# Patient Record
Sex: FEMALE | Race: WHITE | NOT HISPANIC OR LATINO | Employment: FULL TIME | URBAN - METROPOLITAN AREA
[De-identification: names, ages, dates, MRNs, and addresses within clinical notes are randomized per-mention and may not be internally consistent; named-entity substitution may affect disease eponyms.]

---

## 2017-04-21 ENCOUNTER — HISTORICAL (OUTPATIENT)
Dept: RADIOLOGY | Facility: HOSPITAL | Age: 37
End: 2017-04-21

## 2019-08-30 ENCOUNTER — HISTORICAL (OUTPATIENT)
Dept: RADIOLOGY | Facility: HOSPITAL | Age: 39
End: 2019-08-30

## 2021-09-01 ENCOUNTER — HISTORICAL (OUTPATIENT)
Dept: RADIOLOGY | Facility: HOSPITAL | Age: 41
End: 2021-09-01

## 2022-04-11 ENCOUNTER — HISTORICAL (OUTPATIENT)
Dept: ADMINISTRATIVE | Facility: HOSPITAL | Age: 42
End: 2022-04-11

## 2022-04-22 ENCOUNTER — HISTORICAL (OUTPATIENT)
Dept: ADMINISTRATIVE | Facility: HOSPITAL | Age: 42
End: 2022-04-22

## 2022-04-24 VITALS
HEIGHT: 70 IN | WEIGHT: 265.19 LBS | OXYGEN SATURATION: 98 % | DIASTOLIC BLOOD PRESSURE: 80 MMHG | BODY MASS INDEX: 37.97 KG/M2 | SYSTOLIC BLOOD PRESSURE: 122 MMHG

## 2022-10-24 ENCOUNTER — HOSPITAL ENCOUNTER (OUTPATIENT)
Dept: RADIOLOGY | Facility: HOSPITAL | Age: 42
Discharge: HOME | End: 2022-10-24
Attending: NURSE PRACTITIONER
Payer: COMMERCIAL

## 2022-10-24 DIAGNOSIS — R07.9 CHEST PAIN, UNSPECIFIED: ICD-10-CM

## 2022-10-24 PROCEDURE — 71046 X-RAY EXAM CHEST 2 VIEWS: CPT | Mod: TC

## 2023-04-19 ENCOUNTER — OFFICE VISIT (OUTPATIENT)
Dept: FAMILY MEDICINE | Facility: CLINIC | Age: 43
End: 2023-04-19
Payer: COMMERCIAL

## 2023-04-19 VITALS
OXYGEN SATURATION: 96 % | BODY MASS INDEX: 41.47 KG/M2 | DIASTOLIC BLOOD PRESSURE: 62 MMHG | HEART RATE: 97 BPM | TEMPERATURE: 99 F | WEIGHT: 280 LBS | HEIGHT: 69 IN | SYSTOLIC BLOOD PRESSURE: 116 MMHG

## 2023-04-19 DIAGNOSIS — E16.1 HYPERINSULINISM: ICD-10-CM

## 2023-04-19 DIAGNOSIS — Z00.01 ENCOUNTER FOR WELL ADULT EXAM WITH ABNORMAL FINDINGS: Primary | ICD-10-CM

## 2023-04-19 DIAGNOSIS — G47.19 EXCESSIVE DAYTIME SLEEPINESS: ICD-10-CM

## 2023-04-19 PROBLEM — E66.9 OBESITY: Status: ACTIVE | Noted: 2023-04-19

## 2023-04-19 PROBLEM — E55.9 VITAMIN D DEFICIENCY: Status: ACTIVE | Noted: 2023-04-19

## 2023-04-19 PROBLEM — E53.8 COBALAMIN DEFICIENCY: Status: ACTIVE | Noted: 2023-04-19

## 2023-04-19 PROCEDURE — 3008F PR BODY MASS INDEX (BMI) DOCUMENTED: ICD-10-PCS | Mod: CPTII,,, | Performed by: FAMILY MEDICINE

## 2023-04-19 PROCEDURE — 1159F PR MEDICATION LIST DOCUMENTED IN MEDICAL RECORD: ICD-10-PCS | Mod: CPTII,,, | Performed by: FAMILY MEDICINE

## 2023-04-19 PROCEDURE — 99396 PR PREVENTIVE VISIT,EST,40-64: ICD-10-PCS | Mod: ,,, | Performed by: FAMILY MEDICINE

## 2023-04-19 PROCEDURE — 3078F DIAST BP <80 MM HG: CPT | Mod: CPTII,,, | Performed by: FAMILY MEDICINE

## 2023-04-19 PROCEDURE — 3078F PR MOST RECENT DIASTOLIC BLOOD PRESSURE < 80 MM HG: ICD-10-PCS | Mod: CPTII,,, | Performed by: FAMILY MEDICINE

## 2023-04-19 PROCEDURE — 3008F BODY MASS INDEX DOCD: CPT | Mod: CPTII,,, | Performed by: FAMILY MEDICINE

## 2023-04-19 PROCEDURE — 1160F PR REVIEW ALL MEDS BY PRESCRIBER/CLIN PHARMACIST DOCUMENTED: ICD-10-PCS | Mod: CPTII,,, | Performed by: FAMILY MEDICINE

## 2023-04-19 PROCEDURE — 3074F SYST BP LT 130 MM HG: CPT | Mod: CPTII,,, | Performed by: FAMILY MEDICINE

## 2023-04-19 PROCEDURE — 3074F PR MOST RECENT SYSTOLIC BLOOD PRESSURE < 130 MM HG: ICD-10-PCS | Mod: CPTII,,, | Performed by: FAMILY MEDICINE

## 2023-04-19 PROCEDURE — 1160F RVW MEDS BY RX/DR IN RCRD: CPT | Mod: CPTII,,, | Performed by: FAMILY MEDICINE

## 2023-04-19 PROCEDURE — 99396 PREV VISIT EST AGE 40-64: CPT | Mod: ,,, | Performed by: FAMILY MEDICINE

## 2023-04-19 PROCEDURE — 1159F MED LIST DOCD IN RCRD: CPT | Mod: CPTII,,, | Performed by: FAMILY MEDICINE

## 2023-04-19 RX ORDER — THYROID 15 MG/1
TABLET ORAL
COMMUNITY

## 2023-04-19 RX ORDER — KETOCONAZOLE 20 MG/ML
1 SHAMPOO, SUSPENSION TOPICAL WEEKLY
COMMUNITY
Start: 2023-02-06

## 2023-04-19 RX ORDER — CHOLECALCIFEROL (VITAMIN D3) 25 MCG
1000 TABLET ORAL DAILY
COMMUNITY

## 2023-04-19 RX ORDER — TIRZEPATIDE 5 MG/.5ML
5 INJECTION, SOLUTION SUBCUTANEOUS
Qty: 4 PEN | Refills: 11 | Status: SHIPPED | OUTPATIENT
Start: 2023-04-19 | End: 2023-05-09 | Stop reason: DRUGHIGH

## 2023-04-19 RX ORDER — CYANOCOBALAMIN 1000 UG/ML
1 INJECTION, SOLUTION INTRAMUSCULAR; SUBCUTANEOUS WEEKLY
COMMUNITY
Start: 2023-03-17

## 2023-04-19 NOTE — PROGRESS NOTES
"SUBJECTIVE:  HPI    Latonya Douglass is a 43 y.o. female here for Annual Exam.  She reports that she has been having excessive daytime sleepiness associated with fatigue and unrestful sleep.  She snores loudly.  She has not had any witnessed episodes of apnea but she never feels rested even with a full night sleep.  Occasionally, she awakens and melena night gasping for breath.  She recently had some fasting lab work performed at an outlying clinic and is interested in some weight loss options because she had an elevated insulin level.  She has a strong family history of diabetes.      [place] allergies, medications, history, and problem list were updated as appropriate.    ROS:  Constitutional:  See HPI  ENT:  No runny nose, no sore throat  Cardiovascular:  No palpitations, angina, syncope   Respiratory:  No shortness of breath, cough, hemoptysis  GI: No abdominal pain, diarrhea, change in stools  : No dysuria, hematuria  Skin:  No rashes or lesions of concern   Neuro:  No headaches, paresthesias, weakness  Endocrine:  No polyuria, polydipsia, polyphasia  Psychiatric:  Irritability but no depression or anxiety    OBJECTIVE:  Vital signs  Visit Vitals  /62 (BP Location: Left arm, Patient Position: Sitting)   Pulse 97   Temp 98.5 °F (36.9 °C) (Temporal)   Ht 5' 9" (1.753 m)   Wt 127 kg (280 lb)   LMP 04/05/2023   SpO2 96%   BMI 41.35 kg/m²          PHYSICAL EXAM:  General: Awake, alert, no acute distress  ENT:  External auditory canals normal bilaterally .  Tympanic membranes normal bilaterally.  Oropharynx clear.    Neck:  No bruits, no masses  Cardiovascular:  Regular rhythm.  Normal rate.  No murmurs.  Respiratory: Clear to auscultation bilaterally, normal effort  Abdomen: Soft, nontender, nondistended, no hepatosplenomegaly   Extremities:  No cyanosis, no clubbing, no edema  Skin:  No rashes or appreciable lesions   Neuro:  Cranial nerves 2-12 are intact with usual testing.  Gait is normal.  Moves all 4 " extremities equally and symmetrically.  Psychiatric:  Normal mood and affect.    February 1, 2023:  Hemoglobin 15.5, hematocrit 45, white blood cell count 5.2, platelet count 284, BUN 14, creatinine 0.8, GFR 78, glucose 84, TSH 1.37, vitamin-D 29, insulin level 22.5, B12 level 314, C-peptide 3.85  ASSESSMENT/PLAN:  1. Encounter for well adult exam with abnormal findings  Assessment & Plan:  Therapeutic lifestyle changes for weight loss      2. Hyperinsulinism  Assessment & Plan:  Start Mounjaro 2.5 mg weekly and increase to 5 mg weekly after the 1st month    Orders:  -     tirzepatide (MOUNJARO) 5 mg/0.5 mL PnIj; Inject 5 mg into the skin every 7 days.  Dispense: 4 pen; Refill: 11    3. Excessive daytime sleepiness  Assessment & Plan:  Home sleep study.      CPAP if indicated    Orders:  -     Home Sleep Study; Future            Follow Up:  Follow up in about 3 months (around 7/19/2023) for Follow-up.  Follow-up hyperinsulinism therapy

## 2023-05-08 ENCOUNTER — TELEPHONE (OUTPATIENT)
Dept: FAMILY MEDICINE | Facility: CLINIC | Age: 43
End: 2023-05-08

## 2023-05-08 ENCOUNTER — TELEPHONE (OUTPATIENT)
Dept: FAMILY MEDICINE | Facility: CLINIC | Age: 43
End: 2023-05-08
Payer: COMMERCIAL

## 2023-05-08 NOTE — TELEPHONE ENCOUNTER
Spoke to pt and notified her that her insurance denied her mounjaro she can check her formulary and let us know what would be covered

## 2023-05-09 ENCOUNTER — TELEPHONE (OUTPATIENT)
Dept: FAMILY MEDICINE | Facility: CLINIC | Age: 43
End: 2023-05-09
Payer: COMMERCIAL

## 2023-05-09 DIAGNOSIS — E16.1 HYPERINSULINISM: Primary | ICD-10-CM

## 2023-05-09 RX ORDER — TIRZEPATIDE 2.5 MG/.5ML
2.5 INJECTION, SOLUTION SUBCUTANEOUS
Qty: 4 PEN | Refills: 5 | Status: SHIPPED | OUTPATIENT
Start: 2023-05-09 | End: 2023-08-01

## 2023-05-17 ENCOUNTER — LAB VISIT (OUTPATIENT)
Dept: LAB | Facility: HOSPITAL | Age: 43
End: 2023-05-17
Attending: NURSE PRACTITIONER
Payer: COMMERCIAL

## 2023-05-17 DIAGNOSIS — K92.1 BLOOD IN STOOL: Primary | ICD-10-CM

## 2023-05-17 DIAGNOSIS — R10.9 STOMACH PAIN: ICD-10-CM

## 2023-05-17 LAB
CLOSTRIDIUM DIFFICILE GDH ANTIGEN (OHS): NEGATIVE
CLOSTRIDIUM DIFFICILE TOXIN A/B (OHS): NEGATIVE

## 2023-05-17 PROCEDURE — 87045 FECES CULTURE AEROBIC BACT: CPT

## 2023-05-17 PROCEDURE — 86318 IA INFECTIOUS AGENT ANTIBODY: CPT

## 2023-05-20 LAB — BACTERIA STL CULT: NORMAL

## 2023-06-07 ENCOUNTER — PATIENT MESSAGE (OUTPATIENT)
Dept: FAMILY MEDICINE | Facility: CLINIC | Age: 43
End: 2023-06-07
Payer: COMMERCIAL

## 2023-07-13 ENCOUNTER — HOSPITAL ENCOUNTER (OUTPATIENT)
Dept: RADIOLOGY | Facility: HOSPITAL | Age: 43
Discharge: HOME | End: 2023-07-13
Attending: INTERNAL MEDICINE
Payer: COMMERCIAL

## 2023-07-13 DIAGNOSIS — R10.9 STOMACH ACHE: ICD-10-CM

## 2023-07-13 PROCEDURE — 74019 RADEX ABDOMEN 2 VIEWS: CPT | Mod: TC

## 2023-07-24 PROBLEM — Z00.01 ENCOUNTER FOR WELL ADULT EXAM WITH ABNORMAL FINDINGS: Status: RESOLVED | Noted: 2023-04-19 | Resolved: 2023-07-24

## 2023-07-31 NOTE — PROGRESS NOTES
Chief Complaint: Annual exam    Chief Complaint   Patient presents with    Annual Exam     LMP 2023, No form of contraception, MMG 21, PAP 2018.   L Breast u/s 22, Benign.   Has order for MMG        HPI:   43 y.o. WF  presents for an annual gyn exam.  Pt is not currently on any form of contraception. C/o very heavy menses with clots, severe cramping.    Denies hx abnormal pap.   Due for MMG.    Had u/s last year, results follow:  FINDINGS:        Around the clock sonographic evaluation of left breast(s) was performed including all four quadrants as well as the axilla and subareolar regions.        Left breast:    Axilla: No worrisome solid or cystic lesions noted.    6:00: A 5 mm, benign-appearing simple cyst with enhanced through transmission and no worrisome perfusion on color flow mapping is noted.    9:00: A 1 cm, benign-appearing, simple cyst with enhanced through transmission and no worrisome perfusion on color flow mapping is noted.        IMPRESSION:     1.  Benign fibrocystic changes are noted within the left breast as described above (BI-RADS Category 2/benign findings).  Screening mammography at appropriate age intervals or as is felt to be clinically necessary should BE adequate for follow-up.       FmHx: +breast cancer in [place], negative for uterine, ovarian, and colon cancers.     Labs / Significant Studies:  LMP: 2023  Frequency: monthly   Cycle Length: 7 days   Flow: heavy  Intermenstrual Bleeding: No  Postcoital Bleeding: No  Dysmenorrhea: Yes, Moderate   Sexually Active: yes   Dyspareunia: No  Contraception: none    H/o STI: No   Last pap: 2018  H/o Abnormal Pap: No   Colposcopy: no  Gardasil: 0  MM2021, dense, benign  H/o abnl MMG: FBD  Colonoscopy: Never      Family History   Problem Relation Age of Onset    Heart failure Father     Diabetes type II Father     Melanoma Mother     Breast cancer Paternal Aunt     Cervical cancer Neg Hx     Uterine  cancer Neg Hx     Ovarian cancer Neg Hx          Past Medical History:   Diagnosis Date    Cobalamin deficiency 2023    Obesity 2023    Vitamin D deficiency 2023     Past Surgical History:   Procedure Laterality Date    ANTERIOR CRUCIATE LIGAMENT REPAIR         Current Outpatient Medications:     cyanocobalamin 1,000 mcg/mL injection, Inject 1 mL into the muscle once a week., Disp: , Rfl:     ferrous sulfate, dried (IRON, DRIED, ORAL), Take by mouth., Disp: , Rfl:     ketoconazole (NIZORAL) 2 % shampoo, Apply 1 application topically once a week., Disp: , Rfl:     lactulose 10 gram/15 ml (CHRONULAC) 10 gram/15 mL (15 mL) solution, SMARTSI Gram(s) By Mouth 3 Times Daily, Disp: , Rfl:     MOUNJARO 5 mg/0.5 mL PnIj, SMARTSI pre-filled pen syringe SUB-Q Once a Week, Disp: , Rfl:     omeprazole (PRILOSEC) 40 MG capsule, TAKE 1 CAPSULE BY MOUTH TWICE A DAY FOR REFLUX, Disp: , Rfl:     thyroid, pork, (NP THYROID) 15 mg Tab, Take by mouth before breakfast., Disp: , Rfl:     vitamin D (VITAMIN D3) 1000 units Tab, Take 1,000 Units by mouth once daily., Disp: , Rfl:     Review of patient's allergies indicates:  No Known Allergies    Social History     Tobacco Use    Smoking status: Never    Smokeless tobacco: Never   Substance Use Topics    Alcohol use: Yes    Drug use: Never       Review of Systems:  General/Constitutional: Chills denies. Fatigue/weakness denies. Fever denies. Night sweats denies. Hot flashes denies    Respiratory: Cough denies. Hemoptysis denies. SOB denies. Sputum production denies. Wheezing denies .   Cardiovascular: Chest pain denies . Dizziness denies. Palpitations denies. Swelling in hands/feet denies    Gastrointestinal: Abdominal pain denies. Blood in stool denies. Constipation denies. Diarrhea denies. Heartburn denies. Nausea denies. Vomiting denies    Genitourinary: Incontinence denies. Blood in urine denies. Frequent urination denies. Painful urination denies. Urinary urgency  "denies. Nocturia denies    Gynecologic: Irregular menses denies. Heavy bleeding admits Painful menses admits. Vaginal discharge denies. Vaginal odor denies. Vaginal itching denies. Vaginal lesion denies. Pelvic pain denies. Decreased libido denies. Vulvar lesion denies. Prolapse of genital organs denies. Painful intercourse denies. Postcoital bleeding denies    Psychiatric: Depression denies. Anxiety denies     Physical Exam:   Vitals:    08/01/23 0756   BP: 120/76   Weight: 117.6 kg (259 lb 4.2 oz)   Height: 5' 8" (1.727 m)       Body mass index is 39.42 kg/m².       Chaperone: present.     General appearance: healthy, well-nourished and well-developed     Psychiatric: Orientation to time, place and person. Normal mood and affect and active, alert     Skin: Appearance: no rashes or lesions.     Neck:   Neck: supple, FROM, trachea midline. and no masses   Thyroid: no enlargement or nodules and non-tender.       Cardiovascular:   Auscultation: RRR and no murmur.   Peripheral Vascular: no varicosities, LLE edema, RLE edema, calf tenderness, and palpable cord and pedal pulses intact.     Lungs:   Respiratory effort: no intercostal retractions or accessory muscle usage.   Auscultation: no wheezing, rales/crackles, or rhonchi and clear to auscultation.     Breast:   Inspection/Palpation: no tenderness, discrete/distinct masses, skin changes, or abnormal secretions. Nipple appearance normal. + FBC changes noted bilateral    Abdomen:   Auscultation/Inspection/Palpation: no hepatomegaly, splenomegaly, masses, tenderness or CVA tenderness and soft, non-distended bowel sounds preset.    Hernia: no palpable hernias.     Female Genitalia:    Vulva: no masses, tenderness or lesions    Bladder/Urethra: no urethral discharge or mass, normal meatus, bladder non-distended.    Vagina: no tenderness, erythema, cystocele, rectocele, abnormal vaginal discharge or vesicle(s) or ulcers    Cervix: no discharge, no cervical lacerations " noted or motion tenderness and grossly normal    Uterus: normal size and shape and midline, non-tender, and no uterine prolapse.    Adnexa/Parametria: no parametrial tenderness or mass, no adnexal tenderness or ovarian mass.     Lymph Nodes:   Palpation: non tender submandibular nodes, axillary nodes, or inguinal nodes.     Rectal Exam:   Rectum: normal perianal skin.       Assessment:     Patient Active Problem List   Diagnosis    Cobalamin deficiency    Obesity    Vitamin D deficiency    Hyperinsulinism    Excessive daytime sleepiness       Health Maintenance Due   Topic Date Due    Hepatitis C Screening  Never done    COVID-19 Vaccine (1) Never done    HIV Screening  Never done    TETANUS VACCINE  Never done    Cervical Cancer Screening  12/27/2021    Mammogram  09/01/2022     Health Maintenance Topics with due status: Not Due       Topic Last Completion Date    Influenza Vaccine 12/08/2021    Hemoglobin A1c (Diabetic Prevention Screening) 02/01/2023         Plan:    Latonya was seen today for annual exam.    Diagnoses and all orders for this visit:    Abnormal gynecological examination  PAP  Counseled regarding contraceptive options, and need for contraception until no menses for 1 year.  Reviewed calcium needs, exercise, and prevention of osteoporosis.  Healthy diet and light weightbearing exercise if tolerated.  Reviewed normal perimenopausal transition.  Mammogram recommended yearly.  Colonoscopy recommended at age 45.  RTC 1 y   Bilateral fibrocystic breast changes  - Discussed recommendations of annual screening after age of 40 with mammogram and MRI for patients with lifetime risk greater than 25%     - Explained that screening is not 100% reliable.  Advised patient if she notices any changes to her breast including a lump, mass, dimpling, discharge, rash, or tenderness she should contact us immediately.     - Recommend monthly BSE     Family history of breast cancer    Menorrhagia with regular cycle  Lysteda  as directed  Dysmenorrhea  NSAIDs prn  Cervical cancer screening    Screening for STD (sexually transmitted disease)    Other orders  The following orders have not been finalized:  -     tranexamic acid (LYSTEDA) 650 mg tablet

## 2023-08-01 ENCOUNTER — OFFICE VISIT (OUTPATIENT)
Dept: OBSTETRICS AND GYNECOLOGY | Facility: CLINIC | Age: 43
End: 2023-08-01
Payer: COMMERCIAL

## 2023-08-01 VITALS
BODY MASS INDEX: 39.29 KG/M2 | WEIGHT: 259.25 LBS | SYSTOLIC BLOOD PRESSURE: 120 MMHG | DIASTOLIC BLOOD PRESSURE: 76 MMHG | HEIGHT: 68 IN

## 2023-08-01 DIAGNOSIS — N94.6 DYSMENORRHEA: ICD-10-CM

## 2023-08-01 DIAGNOSIS — Z01.411 ABNORMAL GYNECOLOGICAL EXAMINATION: Primary | ICD-10-CM

## 2023-08-01 DIAGNOSIS — N92.0 MENORRHAGIA WITH REGULAR CYCLE: ICD-10-CM

## 2023-08-01 DIAGNOSIS — Z11.3 SCREENING FOR STD (SEXUALLY TRANSMITTED DISEASE): ICD-10-CM

## 2023-08-01 DIAGNOSIS — N60.12 BILATERAL FIBROCYSTIC BREAST CHANGES: ICD-10-CM

## 2023-08-01 DIAGNOSIS — Z80.3 FAMILY HISTORY OF BREAST CANCER: ICD-10-CM

## 2023-08-01 DIAGNOSIS — N60.11 BILATERAL FIBROCYSTIC BREAST CHANGES: ICD-10-CM

## 2023-08-01 DIAGNOSIS — Z12.4 CERVICAL CANCER SCREENING: ICD-10-CM

## 2023-08-01 PROCEDURE — 3074F SYST BP LT 130 MM HG: CPT | Mod: CPTII,,, | Performed by: NURSE PRACTITIONER

## 2023-08-01 PROCEDURE — 99396 PR PREVENTIVE VISIT,EST,40-64: ICD-10-PCS | Mod: ,,, | Performed by: NURSE PRACTITIONER

## 2023-08-01 PROCEDURE — 3008F BODY MASS INDEX DOCD: CPT | Mod: CPTII,,, | Performed by: NURSE PRACTITIONER

## 2023-08-01 PROCEDURE — 3078F PR MOST RECENT DIASTOLIC BLOOD PRESSURE < 80 MM HG: ICD-10-PCS | Mod: CPTII,,, | Performed by: NURSE PRACTITIONER

## 2023-08-01 PROCEDURE — 3074F PR MOST RECENT SYSTOLIC BLOOD PRESSURE < 130 MM HG: ICD-10-PCS | Mod: CPTII,,, | Performed by: NURSE PRACTITIONER

## 2023-08-01 PROCEDURE — 1160F RVW MEDS BY RX/DR IN RCRD: CPT | Mod: CPTII,,, | Performed by: NURSE PRACTITIONER

## 2023-08-01 PROCEDURE — 1159F MED LIST DOCD IN RCRD: CPT | Mod: CPTII,,, | Performed by: NURSE PRACTITIONER

## 2023-08-01 PROCEDURE — 3008F PR BODY MASS INDEX (BMI) DOCUMENTED: ICD-10-PCS | Mod: CPTII,,, | Performed by: NURSE PRACTITIONER

## 2023-08-01 PROCEDURE — 99396 PREV VISIT EST AGE 40-64: CPT | Mod: ,,, | Performed by: NURSE PRACTITIONER

## 2023-08-01 PROCEDURE — 1160F PR REVIEW ALL MEDS BY PRESCRIBER/CLIN PHARMACIST DOCUMENTED: ICD-10-PCS | Mod: CPTII,,, | Performed by: NURSE PRACTITIONER

## 2023-08-01 PROCEDURE — 1159F PR MEDICATION LIST DOCUMENTED IN MEDICAL RECORD: ICD-10-PCS | Mod: CPTII,,, | Performed by: NURSE PRACTITIONER

## 2023-08-01 PROCEDURE — 3078F DIAST BP <80 MM HG: CPT | Mod: CPTII,,, | Performed by: NURSE PRACTITIONER

## 2023-08-01 RX ORDER — LACTULOSE 10 G/15ML
SOLUTION ORAL
COMMUNITY
Start: 2023-07-13

## 2023-08-01 RX ORDER — TRANEXAMIC ACID 650 MG/1
1300 TABLET ORAL 3 TIMES DAILY
Qty: 30 TABLET | Refills: 11 | Status: SHIPPED | OUTPATIENT
Start: 2023-08-01 | End: 2023-08-31

## 2023-08-01 RX ORDER — OMEPRAZOLE 40 MG/1
CAPSULE, DELAYED RELEASE ORAL
COMMUNITY
Start: 2023-05-15

## 2023-08-01 RX ORDER — TIRZEPATIDE 5 MG/.5ML
INJECTION, SOLUTION SUBCUTANEOUS
COMMUNITY
Start: 2023-06-02

## 2023-08-04 ENCOUNTER — TELEPHONE (OUTPATIENT)
Dept: OBSTETRICS AND GYNECOLOGY | Facility: CLINIC | Age: 43
End: 2023-08-04
Payer: COMMERCIAL

## 2023-08-04 LAB — PSYCHE PATHOLOGY RESULT: NORMAL

## 2023-08-22 ENCOUNTER — HOSPITAL ENCOUNTER (OUTPATIENT)
Dept: RADIOLOGY | Facility: HOSPITAL | Age: 43
Discharge: HOME | End: 2023-08-22
Attending: INTERNAL MEDICINE
Payer: COMMERCIAL

## 2023-08-22 DIAGNOSIS — Z12.31 ENCOUNTER FOR SCREENING MAMMOGRAM FOR BREAST CANCER: ICD-10-CM

## 2023-08-22 PROCEDURE — 77067 SCR MAMMO BI INCL CAD: CPT | Mod: 26,,, | Performed by: STUDENT IN AN ORGANIZED HEALTH CARE EDUCATION/TRAINING PROGRAM

## 2023-08-22 PROCEDURE — 77063 BREAST TOMOSYNTHESIS BI: CPT | Mod: 26,,, | Performed by: STUDENT IN AN ORGANIZED HEALTH CARE EDUCATION/TRAINING PROGRAM

## 2023-08-22 PROCEDURE — 77067 MAMMO DIGITAL SCREENING BILAT WITH TOMO: ICD-10-PCS | Mod: 26,,, | Performed by: STUDENT IN AN ORGANIZED HEALTH CARE EDUCATION/TRAINING PROGRAM

## 2023-08-22 PROCEDURE — 77067 SCR MAMMO BI INCL CAD: CPT | Mod: TC

## 2023-08-22 PROCEDURE — 77063 MAMMO DIGITAL SCREENING BILAT WITH TOMO: ICD-10-PCS | Mod: 26,,, | Performed by: STUDENT IN AN ORGANIZED HEALTH CARE EDUCATION/TRAINING PROGRAM

## 2024-06-07 ENCOUNTER — LAB VISIT (OUTPATIENT)
Dept: LAB | Facility: HOSPITAL | Age: 44
End: 2024-06-07
Attending: OBSTETRICS & GYNECOLOGY
Payer: COMMERCIAL

## 2024-06-07 ENCOUNTER — OFFICE VISIT (OUTPATIENT)
Dept: OBSTETRICS AND GYNECOLOGY | Facility: CLINIC | Age: 44
End: 2024-06-07
Payer: COMMERCIAL

## 2024-06-07 VITALS
SYSTOLIC BLOOD PRESSURE: 110 MMHG | HEIGHT: 68 IN | BODY MASS INDEX: 38.04 KG/M2 | DIASTOLIC BLOOD PRESSURE: 70 MMHG | WEIGHT: 251 LBS

## 2024-06-07 DIAGNOSIS — N94.6 DYSMENORRHEA: ICD-10-CM

## 2024-06-07 DIAGNOSIS — N92.1 MENORRHAGIA WITH IRREGULAR CYCLE: Primary | ICD-10-CM

## 2024-06-07 DIAGNOSIS — N92.1 MENORRHAGIA WITH IRREGULAR CYCLE: ICD-10-CM

## 2024-06-07 LAB
ERYTHROCYTE [DISTWIDTH] IN BLOOD BY AUTOMATED COUNT: 12.6 % (ref 11–14.5)
HCT VFR BLD AUTO: 40.7 % (ref 36–48)
HGB BLD-MCNC: 13.8 G/DL (ref 11.8–16)
MCH RBC QN AUTO: 30.9 PG (ref 27–34)
MCHC RBC AUTO-ENTMCNC: 33.9 G/DL (ref 31–37)
MCV RBC AUTO: 91.1 FL (ref 79–99)
NRBC BLD AUTO-RTO: 0 %
PLATELET # BLD AUTO: 236 X10(3)/MCL (ref 140–371)
PMV BLD AUTO: 8.9 FL (ref 9.4–12.4)
RBC # BLD AUTO: 4.47 X10(6)/MCL (ref 4–5.1)
TSH SERPL-ACNC: 1.29 UIU/ML (ref 0.36–3.74)
WBC # SPEC AUTO: 5.89 X10(3)/MCL (ref 4–11.5)

## 2024-06-07 PROCEDURE — 3078F DIAST BP <80 MM HG: CPT | Mod: CPTII,,, | Performed by: OBSTETRICS & GYNECOLOGY

## 2024-06-07 PROCEDURE — 3074F SYST BP LT 130 MM HG: CPT | Mod: CPTII,,, | Performed by: OBSTETRICS & GYNECOLOGY

## 2024-06-07 PROCEDURE — 36415 COLL VENOUS BLD VENIPUNCTURE: CPT

## 2024-06-07 PROCEDURE — 84443 ASSAY THYROID STIM HORMONE: CPT

## 2024-06-07 PROCEDURE — 85027 COMPLETE CBC AUTOMATED: CPT

## 2024-06-07 PROCEDURE — 99214 OFFICE O/P EST MOD 30 MIN: CPT | Mod: ,,, | Performed by: OBSTETRICS & GYNECOLOGY

## 2024-06-07 PROCEDURE — 3008F BODY MASS INDEX DOCD: CPT | Mod: CPTII,,, | Performed by: OBSTETRICS & GYNECOLOGY

## 2024-06-07 PROCEDURE — 1159F MED LIST DOCD IN RCRD: CPT | Mod: CPTII,,, | Performed by: OBSTETRICS & GYNECOLOGY

## 2024-06-07 RX ORDER — SUCRALFATE 1 G/1
1 TABLET ORAL 3 TIMES DAILY
COMMUNITY
Start: 2024-04-17

## 2024-06-07 RX ORDER — NORGESTIMATE AND ETHINYL ESTRADIOL 0.25-0.035
1 KIT ORAL 3 TIMES DAILY
Qty: 21 TABLET | Refills: 0 | Status: SHIPPED | OUTPATIENT
Start: 2024-06-07 | End: 2024-06-14

## 2024-06-07 RX ORDER — VORTIOXETINE 10 MG/1
1 TABLET, FILM COATED ORAL
COMMUNITY
Start: 2024-05-06

## 2024-06-07 RX ORDER — NORGESTIMATE AND ETHINYL ESTRADIOL 0.25-0.035
1 KIT ORAL DAILY
Qty: 30 TABLET | Refills: 11 | Status: SHIPPED | OUTPATIENT
Start: 2024-06-07 | End: 2025-06-07

## 2024-06-07 NOTE — PROGRESS NOTES
Chief Complaint     Irregular bleeding    HPI:     Patient is a 44 y.o.  presents today with complaints of irregular bleeding. Reports menses q 2 weeks x2-3 months. States to be heavy in flow, 5 days in duration. Reports did not do trial of Lysteda for menorrhagia due to insurance. PAP 23 WNL negative HPV GC CZ TV.  Denies abnormal discharge, odor, fever. Denies SOB, chest pain, weak, fatigue.     Gyn History:    Menstrual History   Cycle: Yes  Menarche Age: 12 years  Flow Duration: 5  Flow: (!) Heavy  Interval: 14  Intermenstrual Bleeding: Yes  Dysmenorrhea: Yes  Dysmenorrhea Severity : Mild  [place]  Sexually Active: Yes  Sexual Orientation: heterosexual  Postcoital Bleeding: No  Dyspareunia: No  STI History: No  Contraception: No  Menopause  Menopause Age: 1 years  Breast History  Last Breast Imaging Date: Yes  Date: 23  History of Abnormal Breast Imaging : No  History of Breast Biopsy: No  Pap History   Last pap date: 23  Result: Normal  History of Abnormal Pap: No  HPV Vaccine Completed: No    Per previous   HPI:   43 y.o. WF  presents for an annual gyn exam.  Pt is not currently on any form of contraception. C/o very heavy menses with clots, severe cramping.    Denies hx abnormal pap.   Due for MMG.    Had u/s last year, results follow:  FINDINGS:        Around the clock sonographic evaluation of left breast(s) was performed including all four quadrants as well as the axilla and subareolar regions.        Left breast:    Axilla: No worrisome solid or cystic lesions noted.    6:00: A 5 mm, benign-appearing simple cyst with enhanced through transmission and no worrisome perfusion on color flow mapping is noted.    9:00: A 1 cm, benign-appearing, simple cyst with enhanced through transmission and no worrisome perfusion on color flow mapping is noted.        IMPRESSION:     1.  Benign fibrocystic changes are noted within the left breast as described above (BI-RADS Category  2/benign findings).  Screening mammography at appropriate age intervals or as is felt to be clinically necessary should BE adequate for follow-up.         FmHx: +breast cancer in [place], negative for uterine, ovarian, and colon cancers.     Past Medical History:   Diagnosis Date    Cobalamin deficiency 2023    Obesity 2023    Vitamin D deficiency 2023       Past Surgical History:   Procedure Laterality Date    ANTERIOR CRUCIATE LIGAMENT REPAIR         Family History   Problem Relation Name Age of Onset    Heart failure Father      Diabetes type II Father      Melanoma Mother      Breast cancer Paternal Aunt      Cervical cancer Neg Hx      Uterine cancer Neg Hx      Ovarian cancer Neg Hx         OB History          2    Para   2    Term   2            AB        Living   2         SAB        IAB        Ectopic        Multiple        Live Births   2                 Current Outpatient Medications on File Prior to Visit   Medication Sig Dispense Refill    cyanocobalamin 1,000 mcg/mL injection Inject 1 mL into the muscle once a week.      ferrous sulfate, dried (IRON, DRIED, ORAL) Take by mouth.      ketoconazole (NIZORAL) 2 % shampoo Apply 1 application topically once a week.      omeprazole (PRILOSEC) 40 MG capsule TAKE 1 CAPSULE BY MOUTH TWICE A DAY FOR REFLUX      sucralfate (CARAFATE) 1 gram tablet Take 1 g by mouth 3 (three) times daily.      thyroid, pork, (NP THYROID) 15 mg Tab Take by mouth before breakfast.      TRINTELLIX 10 mg Tab Take 1 tablet by mouth.      vitamin D (VITAMIN D3) 1000 units Tab Take 1,000 Units by mouth once daily.      lactulose 10 gram/15 ml (CHRONULAC) 10 gram/15 mL (15 mL) solution SMARTSI Gram(s) By Mouth 3 Times Daily (Patient not taking: Reported on 2024)      MOUNJARO 5 mg/0.5 mL PnIj SMARTSI pre-filled pen syringe SUB-Q Once a Week (Patient not taking: Reported on 2024)       No current facility-administered medications on file prior to  "visit.       Review of Systems:       Review of Systems   Constitutional:  Negative for chills and fever.   Gastrointestinal:  Negative for abdominal pain, constipation and diarrhea.   Genitourinary:  Positive for menstrual problem and vaginal bleeding. Negative for bladder incontinence, decreased libido, dysmenorrhea, dyspareunia, dysuria, flank pain, frequency, genital sores, hematuria, hot flashes, menorrhagia, pelvic pain, urgency, vaginal discharge, vaginal pain, urinary incontinence, postcoital bleeding, postmenopausal bleeding, vaginal dryness and vaginal odor.        Physical Exam:    /70   Ht 5' 8" (1.727 m)   Wt 113.9 kg (251 lb)   LMP 06/04/2024   BMI 38.16 kg/m²     Physical Exam   General Exam:    General Appearance: alert, in no acute distress, normal, well nourished.  Psych:  Orientation: time, place, person.  Mood/Affect: Normal   Abdomen:  - Soft. Non-tender. No rebound tenderness or guardingNo masses. Liver normal. Spleen normal. No hernia palpable.  Pelvis:   - Vulva: Normal   - Perianal skin: Normal   - Urethra: Normal meatus. Q-tip: Not performed   - Vagina: Blood in vault. Normal Vaginal discharge present: . Cystocele: Absent. Rectocele: Absent   - Cervix: Cervix dilated. Cervical motion tenderness Absent.    - Uterus:  retroverted uterus vs uterine fibroid noted. Mobile. Non-tender.   - Adnexal: Normal      Assessment:   1. Menorrhagia with irregular cycle  -     US Pelvis Comp with Transvag NON-OB (xpd; Future; Expected date: 06/14/2024  -     TSH; Future; Expected date: 06/07/2024  -     CBC Without Differential; Future; Expected date: 06/07/2024  -     norgestimate-ethinyl estradioL (ORTHO-CYCLEN) 0.25-35 mg-mcg per tablet; Take 1 tablet by mouth 3 (three) times daily. for 7 days  Dispense: 21 tablet; Refill: 0  -     norgestimate-ethinyl estradioL (ORTHO-CYCLEN) 0.25-35 mg-mcg per tablet; Take 1 tablet by mouth once daily.  Dispense: 30 tablet; Refill: 11    2. Dysmenorrhea  -    "  US Pelvis Comp with Transvag NON-OB (xpd; Future; Expected date: 06/14/2024  -     norgestimate-ethinyl estradioL (ORTHO-CYCLEN) 0.25-35 mg-mcg per tablet; Take 1 tablet by mouth 3 (three) times daily. for 7 days  Dispense: 21 tablet; Refill: 0  -     norgestimate-ethinyl estradioL (ORTHO-CYCLEN) 0.25-35 mg-mcg per tablet; Take 1 tablet by mouth once daily.  Dispense: 30 tablet; Refill: 11    3. BMI 38.0-38.9,adult             Plan:       GC GC TV MYCO UREA collected   TSH   CBC   Pt desires orders- to be done at her office, printed.    Discussed abnormal uterine bleeding and potential causes including but not limited to: infection, fibroids, other anatomy abnormalities, hormonal changes or abnormalities, pre-cancerous lesions, malignancy.  Dicussed conservative treatment options such as oral contraceptives, transdermal contraceptions, intrauterine device, or tranexamic acid.  We also discuss surgical treatment options such as endometrial ablation or hysterectomy.     Recommend endometrial sampling to rule out cancer prior to treatment.   Discussed office endometrial biopsy and hysteroscope with dilation and curettage and associated limitations, risks, and benefits of each procedure.   Patient elects office endometrial biopsy    Discussed importance of healthy diet, exercise, and achieving/maintaining a BMI <30.     In order to stop her current episode of bleeding we will begin Sprintec t.i.d. for 7 days followed by continuous pill pack until endometrial biopsy can be performed  Rx Sprintec TID x7 days then Rx Sprintec PO daily     Strict bleeding, ER precautions     RTC 2 weeks Results, EMB     This note was transcribed by Nadiya Houston. There may be transcription errors as a result, however minimal. Effort has been made to ensure accuracy of transcription, but any obvious errors or omissions should be clarified with the author of the document.

## 2024-06-12 ENCOUNTER — HOSPITAL ENCOUNTER (OUTPATIENT)
Dept: RADIOLOGY | Facility: HOSPITAL | Age: 44
Discharge: HOME | End: 2024-06-12
Attending: OBSTETRICS & GYNECOLOGY
Payer: COMMERCIAL

## 2024-06-12 DIAGNOSIS — N94.6 DYSMENORRHEA: ICD-10-CM

## 2024-06-12 DIAGNOSIS — N92.1 MENORRHAGIA WITH IRREGULAR CYCLE: ICD-10-CM

## 2024-06-12 PROCEDURE — 76856 US EXAM PELVIC COMPLETE: CPT | Mod: TC

## 2024-06-12 PROCEDURE — 76830 TRANSVAGINAL US NON-OB: CPT | Mod: TC

## 2024-06-13 NOTE — PROGRESS NOTES
Chief complaint:  Procedure (EMB. -UPT)      History of Present Illness:  Latonya Douglass is a 44 y.o. female  presents for an endometrial biopsy secondary to AUB.  UPT is negative. Patient agrees to proceed.    24: negative GC CZ TV MYCO UREA     Gyn History:    Menstrual History   Cycle: Yes (LMP:23)  Menarche Age: 12 years  Flow Duration: 7  Flow: (!) Heavy  Interval: 28  Intermenstrual Bleeding: No  Dysmenorrhea: Yes  Dysmenorrhea Severity : Moderate  [place]  Sexually Active: Yes  Sexual Orientation: heterosexual  Postcoital Bleeding: No  Dyspareunia: No  STI History: No  Contraception: No  Menopause  Menopause Age: 1 years  Breast History  Last Breast Imaging Date: Yes  Date: 23 (neg)  History of Abnormal Breast Imaging : (!) Yes (FBD)  Pap History   Last pap date: 23 (neg w/ neg GC/CZ/TV)  Result: Normal  History of Abnormal Pap: No  HPV Vaccine Completed: No    PER PREVIOUS NOTE 24:  Patient is a 44 y.o.  presents today with complaints of irregular bleeding. Reports menses q 2 weeks x2-3 months. States to be heavy in flow, 5 days in duration. Reports did not do trial of Lysteda for menorrhagia due to insurance. PAP 23 WNL negative HPV GC CZ TV.  Denies abnormal discharge, odor, fever. Denies SOB, chest pain, weak, fatigue.      Per previous note:   HPI:   43 y.o. WF  presents for an annual gyn exam.  Pt is not currently on any form of contraception. C/o very heavy menses with clots, severe cramping.    Denies hx abnormal pap.   Due for MMG.    Had u/s last year, results follow:  FINDINGS:        Around the clock sonographic evaluation of left breast(s) was performed including all four quadrants as well as the axilla and subareolar regions.        Left breast:    Axilla: No worrisome solid or cystic lesions noted.    6:00: A 5 mm, benign-appearing simple cyst with enhanced through transmission and no worrisome perfusion on color flow mapping is noted.    9:00:  A 1 cm, benign-appearing, simple cyst with enhanced through transmission and no worrisome perfusion on color flow mapping is noted.        IMPRESSION:     1.  Benign fibrocystic changes are noted within the left breast as described above (BI-RADS Category 2/benign findings).  Screening mammography at appropriate age intervals or as is felt to be clinically necessary should BE adequate for follow-up.         FmHx: +breast cancer in [place], negative for uterine, ovarian, and colon cancers      Review of Systems:  General/Constitutional: Chills denies. Fatigue/weakness denies. Fever denies . Night sweats denies . Hot flashes denies  Gastrointestinal: Abdominal pain denies. Blood in stool denies. Constipation denies. Diarrhea denies. Heartburn denies. Nausea denies. Vomiting denies   Genitourinary: Incontinence denies. Blood in urine denies. Frequent urination denies.  Urgency denies. Painful urination denies. Nocturia denies   Gynecologic: Irregular menses denies.  Heavy bleeding  denies.  Painful menses denies.  Vaginal discharge denies. Vaginal odor denies. Vaginal itching/Irritation denies. Vaginal lesion denies.  Pelvic pain denies. Decreased libido denies. Vulvar lesion denies. Prolapse of genital organs denies. Painful intercourse denies. Postcoital bleeding denies   Psychiatric: Mood lability denies.  Depressed mood denies. Suicidal thoughts denies. Anxiety denies.  Overwhelmed denies.  Appetite normal. Energy level normal     OB History          2    Para   2    Term   2            AB        Living   2         SAB        IAB        Ectopic        Multiple        Live Births   2                 Past Medical History:   Diagnosis Date    Cobalamin deficiency 2023    Obesity 2023    Vitamin D deficiency 2023         Current Outpatient Medications:     cyanocobalamin 1,000 mcg/mL injection, Inject 1 mL into the muscle once a week., Disp: , Rfl:     ferrous sulfate, dried (IRON, DRIED, ORAL),  "Take by mouth., Disp: , Rfl:     ketoconazole (NIZORAL) 2 % shampoo, Apply 1 application topically once a week., Disp: , Rfl:     norgestimate-ethinyl estradioL (ORTHO-CYCLEN) 0.25-35 mg-mcg per tablet, Take 1 tablet by mouth once daily., Disp: 30 tablet, Rfl: 11    omeprazole (PRILOSEC) 40 MG capsule, TAKE 1 CAPSULE BY MOUTH TWICE A DAY FOR REFLUX, Disp: , Rfl:     sucralfate (CARAFATE) 1 gram tablet, Take 1 g by mouth 3 (three) times daily., Disp: , Rfl:     thyroid, pork, (NP THYROID) 15 mg Tab, Take by mouth before breakfast., Disp: , Rfl:     TRINTELLIX 10 mg Tab, Take 1 tablet by mouth., Disp: , Rfl:     vitamin D (VITAMIN D3) 1000 units Tab, Take 1,000 Units by mouth once daily., Disp: , Rfl:     lactulose 10 gram/15 ml (CHRONULAC) 10 gram/15 mL (15 mL) solution, SMARTSI Gram(s) By Mouth 3 Times Daily (Patient not taking: Reported on 2024), Disp: , Rfl:     MOUNJARO 5 mg/0.5 mL PnIj, SMARTSI pre-filled pen syringe SUB-Q Once a Week (Patient not taking: Reported on 2024), Disp: , Rfl:     Review of patient's allergies indicates:  No Known Allergies    Past Surgical History:   Procedure Laterality Date    ANTERIOR CRUCIATE LIGAMENT REPAIR         Family History   Problem Relation Name Age of Onset    Heart failure Father      Diabetes type II Father      Melanoma Mother      Breast cancer Paternal Aunt      Cervical cancer Neg Hx      Uterine cancer Neg Hx      Ovarian cancer Neg Hx         Social History     Socioeconomic History    Marital status:    Tobacco Use    Smoking status: Never    Smokeless tobacco: Never   Substance and Sexual Activity    Alcohol use: Yes    Drug use: Never    Sexual activity: Yes     Partners: Male     Birth control/protection: None         Physical Exam:  BP (P) 122/78 (BP Location: Right arm, Patient Position: Sitting, BP Method: Large (Manual))   Pulse (P) 68   Resp (P) 20   Ht (P) 5' 8" (1.727 m)   Wt (P) 114.9 kg (253 lb 6.4 oz)   LMP 2024   " BMI (P) 38.53 kg/m²       Chaperone present.     Constitutional: General appearance: healthy, well-nourished and well-developed   Psychiatric: Orientation to time, place and person. Normal mood and affect and active, alert   Abdomen: Auscultation/Inspection/Palpation: No tenderness or masses. Soft, nondistended    Female Genitalia:      Vulva: no masses, atrophy or lesions      Bladder/Urethra: no urethral discharge or mass, normal meatus, bladder non-distended.      Vagina: no tenderness, erythema, cystocele, rectocele, abnormal vaginal discharge, or vesicle(s) or ulcers                   Cervix: no discharge or cervical motion tenderness and grossly normal      Uterus: normal size and shape and midline, non-tender, and no uterine prolapse.      Adnexa/Parametria: no parametrial tenderness or mass, no adnexal tenderness or ovarian mass.       PROCEDURE:   Verbal consent was obtained.      UPT negative.       Speculum was placed in the vagina. Cervix was cleaned with 3 iodine. The anterior lip of the cervix was then grasped with a single tooth tenaculum. Endometrial Pipette was then advanced into the uterus. Suction was then applied to the endometrial Pipette curettage and was performed to 360 degrees. Pipette was then removed from the uterus and the specimen was then placed in specimen cup adequate tissue was confirmed. Tenaculum was then removed from the patient's cervix.  Excellent hemostasis was achieved. All instruments removed from the patient's vagina.   The specimen was placed in formalyn and sent to Pathology for histology evaluation.The patient tolerated the procedure well.      Assessment/Plan:  1. Menorrhagia with irregular cycle  -     POCT urine pregnancy    2. Dysmenorrhea           POST ENDOMETRIAL BIOPSY COUNSELING:  Manage post biopsy cramping with NSAIDs or Tylenol.  Expect spotting or light bleeding for a few days.  Report bleeding heavier than a period, fever > 101.0 F, worsening pain or a foul  smelling vaginal discharge  Pelvic rest    All questions were answered.    RTC 2 weeks EMB results     This note was transcribed by Nadiya Houston. There may be transcription errors as a result, however minimal. Effort has been made to ensure accuracy of transcription, but any obvious errors or omissions should be clarified with the author of the document.

## 2024-06-19 ENCOUNTER — PROCEDURE VISIT (OUTPATIENT)
Dept: OBSTETRICS AND GYNECOLOGY | Facility: CLINIC | Age: 44
End: 2024-06-19
Payer: COMMERCIAL

## 2024-06-19 DIAGNOSIS — N92.1 MENORRHAGIA WITH IRREGULAR CYCLE: Primary | ICD-10-CM

## 2024-06-19 DIAGNOSIS — N94.6 DYSMENORRHEA: ICD-10-CM

## 2024-06-19 LAB
B-HCG UR QL: NEGATIVE
CTP QC/QA: YES

## 2024-06-19 PROCEDURE — 81025 URINE PREGNANCY TEST: CPT | Mod: ,,, | Performed by: OBSTETRICS & GYNECOLOGY

## 2024-06-19 PROCEDURE — 99499 UNLISTED E&M SERVICE: CPT | Mod: ,,, | Performed by: OBSTETRICS & GYNECOLOGY

## 2024-06-19 PROCEDURE — 58100 BIOPSY OF UTERUS LINING: CPT | Mod: ,,, | Performed by: OBSTETRICS & GYNECOLOGY

## 2024-07-02 NOTE — PROGRESS NOTES
Chief Complaint     Follow-up (F/u EMB results)    HPI:     Patient is a 44 y.o.  with AUB, dysmenorrhea, and BMI 39 resents to follow up EMB results and discuss management options.     EMB:  ENDOMETRIUM, BIOPSY:   PROLIFERATIVE ENDOMETRIUM, NO EVIDENCE OF HYPERPLASIA OR MALIGNANCY IDENTIFIED.         PER PREVIOUS NOTE 24:  Patient is a 44 y.o.  presents today with complaints of irregular bleeding. Reports menses q 2 weeks x2-3 months. States to be heavy in flow, 5 days in duration. Reports did not do trial of Lysteda for menorrhagia due to insurance. PAP 23 WNL negative HPV GC CZ TV.  Denies abnormal discharge, odor, fever. Denies SOB, chest pain, weak, fatigue.      Per previous note:   HPI:   43 y.o. WF  presents for an annual gyn exam.  Pt is not currently on any form of contraception. C/o very heavy menses with clots, severe cramping.    Denies hx abnormal pap.   Due for MMG.    Had u/s last year, results follow:  FINDINGS:    Around the clock sonographic evaluation of left breast(s) was performed including all four quadrants as well as the axilla and subareolar regions.    Left breast:    Axilla: No worrisome solid or cystic lesions noted.    6:00: A 5 mm, benign-appearing simple cyst with enhanced through transmission and no worrisome perfusion on color flow mapping is noted.    9:00: A 1 cm, benign-appearing, simple cyst with enhanced through transmission and no worrisome perfusion on color flow mapping is noted.    IMPRESSION:     1.  Benign fibrocystic changes are noted within the left breast as described above (BI-RADS Category 2/benign findings).  Screening mammography at appropriate age intervals or as is felt to be clinically necessary should BE adequate for follow-up.      FmHx: +breast cancer in [place], negative for uterine, ovarian, and colon cancers          Past Medical History:   Diagnosis Date    Cobalamin deficiency 2023    Obesity 2023    Vitamin D  deficiency 2023       Past Surgical History:   Procedure Laterality Date    ANTERIOR CRUCIATE LIGAMENT REPAIR         Family History   Problem Relation Name Age of Onset    Heart failure Father      Diabetes type II Father      Melanoma Mother      Breast cancer Paternal Aunt      Cervical cancer Neg Hx      Uterine cancer Neg Hx      Ovarian cancer Neg Hx         OB History          2    Para   2    Term   2            AB        Living   2         SAB        IAB        Ectopic        Multiple        Live Births   2                 Current Outpatient Medications on File Prior to Visit   Medication Sig Dispense Refill    cyanocobalamin 1,000 mcg/mL injection Inject 1 mL into the muscle once a week.      ferrous sulfate, dried (IRON, DRIED, ORAL) Take by mouth.      ketoconazole (NIZORAL) 2 % shampoo Apply 1 application topically once a week.      omeprazole (PRILOSEC) 40 MG capsule TAKE 1 CAPSULE BY MOUTH TWICE A DAY FOR REFLUX      sucralfate (CARAFATE) 1 gram tablet Take 1 g by mouth 3 (three) times daily.      thyroid, pork, (NP THYROID) 15 mg Tab Take by mouth before breakfast.      TRINTELLIX 10 mg Tab Take 1 tablet by mouth.      vitamin D (VITAMIN D3) 1000 units Tab Take 1,000 Units by mouth once daily.      lactulose 10 gram/15 ml (CHRONULAC) 10 gram/15 mL (15 mL) solution SMARTSI Gram(s) By Mouth 3 Times Daily (Patient not taking: Reported on 2024)      MOUNJARO 5 mg/0.5 mL PnIj SMARTSI pre-filled pen syringe SUB-Q Once a Week (Patient not taking: Reported on 2024)      norgestimate-ethinyl estradioL (ORTHO-CYCLEN) 0.25-35 mg-mcg per tablet Take 1 tablet by mouth once daily. (Patient not taking: Reported on 7/3/2024) 30 tablet 11     No current facility-administered medications on file prior to visit.       Review of Systems:       Review of Systems   Constitutional:  Negative for chills and fever.   Gastrointestinal:  Negative for abdominal pain, constipation and diarrhea.    Genitourinary:  Positive for vaginal bleeding. Negative for bladder incontinence, decreased libido, dysmenorrhea, dyspareunia, dysuria, flank pain, frequency, genital sores, hematuria, hot flashes, menorrhagia, menstrual problem, pelvic pain, urgency, vaginal discharge, vaginal pain, urinary incontinence, postcoital bleeding, postmenopausal bleeding, vaginal dryness and vaginal odor.        Physical Exam:    /72 (BP Location: Right arm, Patient Position: Sitting, BP Method: Large (Manual))   Wt 116.7 kg (257 lb 3.2 oz)   LMP 06/04/2024   BMI (P) 39.11 kg/m²     Physical Exam   General Exam:    General Appearance: alert, in no acute distress, normal, well nourished.  Psych:  Orientation: time, place, person.  Mood/Affect: Normal       Assessment:   1. Menorrhagia with irregular cycle    2. Dysmenorrhea    3. BMI 39.0-39.9,adult             Plan:   Reviewed pathology  Discussed treatment options with pt for menorrhagia, dysmenorrhea.   Dicussed conservative treatment options such as oral contraceptives, transdermal contraceptions, intrauterine device, or tranexamic acid.   Ibuprofen 600mg q 6 hours or 800mg q 8 hours at onset of pain.   We also discuss surgical treatment options such as endometrial ablation or hysterectomy. Discussed each treatment option in detail including potential side effects and risks of each. Pt desires to consider options and will call office with decision.     RTC PRN      This note was transcribed by Nadiya Houston. There may be transcription errors as a result, however minimal. Effort has been made to ensure accuracy of transcription, but any obvious errors or omissions should be clarified with the author of the document.

## 2024-07-03 ENCOUNTER — OFFICE VISIT (OUTPATIENT)
Dept: OBSTETRICS AND GYNECOLOGY | Facility: CLINIC | Age: 44
End: 2024-07-03
Payer: COMMERCIAL

## 2024-07-03 VITALS
DIASTOLIC BLOOD PRESSURE: 72 MMHG | WEIGHT: 257.19 LBS | SYSTOLIC BLOOD PRESSURE: 118 MMHG | BODY MASS INDEX: 39.11 KG/M2

## 2024-07-03 DIAGNOSIS — N92.1 MENORRHAGIA WITH IRREGULAR CYCLE: Primary | ICD-10-CM

## 2024-07-03 DIAGNOSIS — N94.6 DYSMENORRHEA: ICD-10-CM

## 2024-07-03 PROCEDURE — 3074F SYST BP LT 130 MM HG: CPT | Mod: CPTII,,, | Performed by: OBSTETRICS & GYNECOLOGY

## 2024-07-03 PROCEDURE — 3008F BODY MASS INDEX DOCD: CPT | Mod: CPTII,,, | Performed by: OBSTETRICS & GYNECOLOGY

## 2024-07-03 PROCEDURE — 99213 OFFICE O/P EST LOW 20 MIN: CPT | Mod: ,,, | Performed by: OBSTETRICS & GYNECOLOGY

## 2024-07-03 PROCEDURE — 1159F MED LIST DOCD IN RCRD: CPT | Mod: CPTII,,, | Performed by: OBSTETRICS & GYNECOLOGY

## 2024-07-03 PROCEDURE — 3078F DIAST BP <80 MM HG: CPT | Mod: CPTII,,, | Performed by: OBSTETRICS & GYNECOLOGY

## 2024-07-17 ENCOUNTER — TELEPHONE (OUTPATIENT)
Dept: OBSTETRICS AND GYNECOLOGY | Facility: CLINIC | Age: 44
End: 2024-07-17
Payer: COMMERCIAL

## 2024-07-17 NOTE — TELEPHONE ENCOUNTER
----- Message from Rhonda Fredrick sent at 7/17/2024 11:05 AM CDT -----  Regarding: Patient Message  Contact: Patient  Patient called to inform she would like to schedule her surgery 12/11/2024 or 12/12/24.   Informed patient she will be contacted by office with further instructions.   [phone]

## 2024-07-17 NOTE — TELEPHONE ENCOUNTER
"Per ADALGISA "Put her down tentatively for December 11th. Pre op will need to be around November 11th." Patient notified and call transferred to [facility] to schedule appointment.   "

## 2024-11-25 ENCOUNTER — TELEPHONE (OUTPATIENT)
Dept: OBSTETRICS AND GYNECOLOGY | Facility: CLINIC | Age: 44
End: 2024-11-25
Payer: COMMERCIAL

## 2024-11-25 NOTE — TELEPHONE ENCOUNTER
----- Message from Rhonda sent at 11/25/2024  9:30 AM CST -----  Regarding: Patient Message  [phone]  Patient called wanting to schedule her pre admit apt. That she previously cancelled 11/20/24.  When would Dr Schneider like her to come in? She previously was going to be scheduled for surgery 12/11/24.  Patient wants surgery before December 31 b/c of her deductible.

## 2024-11-25 NOTE — TELEPHONE ENCOUNTER
Patient notified that her surgery will not be able to be scheduled for before December d/t missing her pre op appointment. Patient got rude and stated we are not treating her fairly. Will speak with Dr. Schneider tomorrow to notify him of conversation.

## 2024-11-26 NOTE — TELEPHONE ENCOUNTER
Placed call to pt, informed of Dr Rosario's response. Pt expressed frustration regarding uncertainty of being able to perform surgery before the end of the year. Explained to pt I am trying my best to work her in for discussion for surgery. Pt continued to express frustration, I explained to pt I will not continue the conversation if she continues to be disrespectful. I spoke with front staff and gave pt several dates for discussion. Pt was not happy with the dates. Per Rhonda, offered pt 12/9/24 at 3:40. Pt desires discussion on that date. Again, I explained to pt this does not guarantee surgery will be before the end of the year. Pt voiced understanding.

## 2024-11-26 NOTE — TELEPHONE ENCOUNTER
"Per Dr. Schneider "She can schedule an appt to discuss new surgery date. If appt falls within appropriate date range for pre-admit will do so at time of appt. Earliest availability for surgery is early January."  "

## 2024-11-27 ENCOUNTER — DOCUMENTATION ONLY (OUTPATIENT)
Dept: OBSTETRICS AND GYNECOLOGY | Facility: CLINIC | Age: 44
End: 2024-11-27
Payer: COMMERCIAL

## 2024-11-27 NOTE — PROGRESS NOTES
Spoke with Rhonda in front office. Per Appt notes:  Pt had appt for 11/13/24 for surgery discussion. On 7/30/24 office contacted pt to r/s due to provider being out of office that date. Pt was r/s to 11/20/24 for surgery discussion.   On 11/12/24 pt called office to cancel appt for discussion on 11/20/24 and instructed front staff she did not wish to r/s appt.

## 2024-12-05 ENCOUNTER — TELEPHONE (OUTPATIENT)
Dept: OBSTETRICS AND GYNECOLOGY | Facility: CLINIC | Age: 44
End: 2024-12-05
Payer: COMMERCIAL

## 2024-12-05 DIAGNOSIS — N94.6 DYSMENORRHEA: ICD-10-CM

## 2024-12-05 DIAGNOSIS — N92.1 MENORRHAGIA WITH IRREGULAR CYCLE: Primary | ICD-10-CM

## 2024-12-05 NOTE — TELEPHONE ENCOUNTER
"Per Dr. Schneider "Opening for surgery case on 12/31/24. Please let her know this is the only availability. Will need pre-admit next week in clinic."  "

## 2024-12-09 ENCOUNTER — LAB VISIT (OUTPATIENT)
Dept: LAB | Facility: HOSPITAL | Age: 44
End: 2024-12-09
Attending: OBSTETRICS & GYNECOLOGY
Payer: COMMERCIAL

## 2024-12-09 DIAGNOSIS — Z01.812 PRE-OPERATIVE LABORATORY EXAMINATION: Primary | ICD-10-CM

## 2024-12-09 LAB
ABORH RETYPE: NORMAL
ANION GAP SERPL CALC-SCNC: 5 MEQ/L
B-HCG UR QL: NEGATIVE
BUN SERPL-MCNC: 12.4 MG/DL (ref 7–18.7)
CALCIUM SERPL-MCNC: 8.4 MG/DL (ref 8.4–10.2)
CHLORIDE SERPL-SCNC: 106 MMOL/L (ref 98–107)
CO2 SERPL-SCNC: 28 MMOL/L (ref 22–29)
CREAT SERPL-MCNC: 0.79 MG/DL (ref 0.55–1.02)
CREAT/UREA NIT SERPL: 16
ERYTHROCYTE [DISTWIDTH] IN BLOOD BY AUTOMATED COUNT: 12.8 % (ref 11.5–17)
GFR SERPLBLD CREATININE-BSD FMLA CKD-EPI: >60 ML/MIN/1.73/M2
GLUCOSE SERPL-MCNC: 142 MG/DL (ref 74–100)
GROUP & RH: NORMAL
HCT VFR BLD AUTO: 40.7 % (ref 37–47)
HGB BLD-MCNC: 13.7 G/DL (ref 12–16)
INDIRECT COOMBS: NORMAL
MCH RBC QN AUTO: 31.2 PG (ref 27–31)
MCHC RBC AUTO-ENTMCNC: 33.7 G/DL (ref 33–36)
MCV RBC AUTO: 92.7 FL (ref 80–94)
NRBC BLD AUTO-RTO: 0 %
PLATELET # BLD AUTO: 245 X10(3)/MCL (ref 130–400)
PMV BLD AUTO: 9.4 FL (ref 7.4–10.4)
POTASSIUM SERPL-SCNC: 4.3 MMOL/L (ref 3.5–5.1)
RBC # BLD AUTO: 4.39 X10(6)/MCL (ref 4.2–5.4)
SODIUM SERPL-SCNC: 139 MMOL/L (ref 136–145)
SPECIMEN OUTDATE: NORMAL
WBC # BLD AUTO: 6.8 X10(3)/MCL (ref 4.5–11.5)

## 2024-12-09 PROCEDURE — 86850 RBC ANTIBODY SCREEN: CPT | Performed by: OBSTETRICS & GYNECOLOGY

## 2024-12-09 PROCEDURE — 80048 BASIC METABOLIC PNL TOTAL CA: CPT

## 2024-12-09 PROCEDURE — 81025 URINE PREGNANCY TEST: CPT

## 2024-12-09 PROCEDURE — 85027 COMPLETE CBC AUTOMATED: CPT

## 2024-12-09 PROCEDURE — 36415 COLL VENOUS BLD VENIPUNCTURE: CPT

## 2024-12-10 ENCOUNTER — ANESTHESIA EVENT (OUTPATIENT)
Dept: SURGERY | Facility: HOSPITAL | Age: 44
End: 2024-12-10
Payer: COMMERCIAL

## 2024-12-10 NOTE — H&P
Patient ID: Latonya Douglass is a 44 y.o. female.    Chief Complaint: No chief complaint on file.      HPI:  HPI heavy menstrual cycles    Review of Systems nl 12 point review    No current facility-administered medications for this encounter.     Current Outpatient Medications   Medication Sig Dispense Refill    cyanocobalamin 1,000 mcg/mL injection Inject 1 mL into the muscle once a week.      omeprazole (PRILOSEC) 40 MG capsule Take 40 mg by mouth every morning.      TRINTELLIX 10 mg Tab Take 1 tablet by mouth once daily.      ferrous sulfate, dried (IRON, DRIED, ORAL) Take by mouth.      ketoconazole (NIZORAL) 2 % shampoo Apply 1 application topically once a week.      lactulose 10 gram/15 ml (CHRONULAC) 10 gram/15 mL (15 mL) solution SMARTSI Gram(s) By Mouth 3 Times Daily (Patient not taking: Reported on 2024)      MOUNJARO 5 mg/0.5 mL PnIj       norgestimate-ethinyl estradioL (ORTHO-CYCLEN) 0.25-35 mg-mcg per tablet Take 1 tablet by mouth once daily. (Patient not taking: Reported on 7/3/2024) 30 tablet 11    sucralfate (CARAFATE) 1 gram tablet Take 1 g by mouth 3 (three) times daily.      thyroid, pork, (NP THYROID) 15 mg Tab Take by mouth before breakfast.      vitamin D (VITAMIN D3) 1000 units Tab Take 1,000 Units by mouth once daily.         Review of patient's allergies indicates:  No Known Allergies    Past Medical History:   Diagnosis Date    Cobalamin deficiency 2023    Digestive disorder     Obesity 2023    Vitamin D deficiency 2023       Past Surgical History:   Procedure Laterality Date    ANTERIOR CRUCIATE LIGAMENT REPAIR Right        Social History     Socioeconomic History    Marital status:    Tobacco Use    Smoking status: Never    Smokeless tobacco: Never   Substance and Sexual Activity    Alcohol use: Yes     Comment: social    Drug use: Never    Sexual activity: Yes     Partners: Male     Birth control/protection: None       There were no vitals filed for this  visit.    Physical Exam  S1s2 no murmers  Abd-soft nt nd  Lungs-cta b  Ext-no c/c/e  Neuro-intact  Ext genit-nl  Cervix-parous no lesions  Uterus-nl mobile  Adnexa-no masses appreciated    Assessment & Plan:  Turner ricketts

## 2024-12-11 NOTE — PRE-PROCEDURE INSTRUCTIONS
"[facility]: Outpatient Surgery  Preprocedure Check-In Instructions     Your arrival time for your surgery or procedure is 5:00 am.  We ask patients to arrive about 2 hours before surgery to allow for enough time to review your health history & medications, start your IV, complete any outstanding labwork or tests, and meet your Anesthesiologist.    Expectations: "Because of inconsistent procedure completion times, an unexpected wait may occur. The Physicians would like you to be here to prepare in the event they run ahead of time. We will make you as comfortable as possible and keep you informed. We apologize in advance if this happens."    You will arrive at [facility], [facility] [place], [place].  Enter through the [place] entrance next to the Emergency Room, and come to the 6th floor to the Outpatient Surgery Department.     Visitory Policy:  You are allowed 2 adult visitors to be with you in the hospital. All hospital visitors should be in good current health.  No small children.     What to Bring:  Please have your ID, insurance cards, and all home medication bottles with you at check in.  Bring your CPAP machine if one is used at home.     Fasting:  Nothing to eat after midnight the night before your procedure. This includes no gum and/or tobacco products. You may have clear liquids limited to only: WATER, GATORADE, POWERADE and children can also have PEDIALYTE AND/OR APPLE JUICE , up until 2 hours before your arrival time.   Follow your doctor's instructions for taking any medications on the morning of your procedure.  If no instructions for taking medications were given, do not take any medications but bring your medications in their bottles to your procedure check in.     Follow your doctor's preoperative instructions regarding skin prep, bowel prep, bathing, or showering prior to your procedure.  If any special soaps were provided to you, please use according to " your doctor's instructions. If no instructions were given from your doctor, take a good bath or shower with antibacterial soap the night before and the morning of your procedure.  On the morning of procedure, wear loose, comfortable clothing.  No lotions, makeup, perfumes, colognes, deodorant, or jewelry to your procedure.  Removable items (glasses, contact lenses, dentures, retainers, hearing aids) need to be removed for your procedure.  Bring your storage containers for these items if you wear them.     Artificial nails, body jewelry, eyelash extensions, and/or hair extensions with metal clips are not allowed during your surgery.  If you currently wear any of these items, please arrange for them to be removed prior to your arrival to the hospital.     Outpatient or Same Day Surgeries:  Any patients receiving sedation/anesthesia are advised not to drive for 24 hours after their procedure.  We do not allow patients to drive themselves home after discharge.  If you are going home after your procedure, please have someone available to drive you home from the hospital.        You may call the Outpatient Surgery Department at [phone] with any questions or concerns.  We are looking forward to meeting you and taking great care of you for your procedure.  Thank you for choosing [facility] for your surgical needs.

## 2024-12-12 ENCOUNTER — HOSPITAL ENCOUNTER (OUTPATIENT)
Facility: HOSPITAL | Age: 44
Discharge: HOME | End: 2024-12-12
Attending: OBSTETRICS & GYNECOLOGY | Admitting: OBSTETRICS & GYNECOLOGY
Payer: COMMERCIAL

## 2024-12-12 ENCOUNTER — ANESTHESIA (OUTPATIENT)
Dept: SURGERY | Facility: HOSPITAL | Age: 44
End: 2024-12-12
Payer: COMMERCIAL

## 2024-12-12 DIAGNOSIS — N94.6 DYSMENORRHEA: ICD-10-CM

## 2024-12-12 DIAGNOSIS — E16.1 HYPERINSULINISM: ICD-10-CM

## 2024-12-12 DIAGNOSIS — N93.9 ABNORMAL UTERINE BLEEDING: ICD-10-CM

## 2024-12-12 DIAGNOSIS — Z01.811 PRE-OP CHEST EXAM: ICD-10-CM

## 2024-12-12 DIAGNOSIS — N92.1 MENORRHAGIA WITH IRREGULAR CYCLE: Primary | ICD-10-CM

## 2024-12-12 DIAGNOSIS — E53.8 COBALAMIN DEFICIENCY: ICD-10-CM

## 2024-12-12 DIAGNOSIS — N92.5 OTHER SPECIFIED IRREGULAR MENSTRUATION: ICD-10-CM

## 2024-12-12 DIAGNOSIS — G47.19 EXCESSIVE DAYTIME SLEEPINESS: ICD-10-CM

## 2024-12-12 DIAGNOSIS — E55.9 VITAMIN D DEFICIENCY: ICD-10-CM

## 2024-12-12 LAB
B-HCG UR QL: NEGATIVE
CTP QC/QA: YES

## 2024-12-12 PROCEDURE — 37000009 HC ANESTHESIA EA ADD 15 MINS: Performed by: OBSTETRICS & GYNECOLOGY

## 2024-12-12 PROCEDURE — 71000016 HC POSTOP RECOV ADDL HR: Performed by: OBSTETRICS & GYNECOLOGY

## 2024-12-12 PROCEDURE — 81025 URINE PREGNANCY TEST: CPT | Performed by: OBSTETRICS & GYNECOLOGY

## 2024-12-12 PROCEDURE — 25000003 PHARM REV CODE 250: Performed by: NURSE ANESTHETIST, CERTIFIED REGISTERED

## 2024-12-12 PROCEDURE — 63600175 PHARM REV CODE 636 W HCPCS: Performed by: OBSTETRICS & GYNECOLOGY

## 2024-12-12 PROCEDURE — 25000003 PHARM REV CODE 250: Performed by: ANESTHESIOLOGY

## 2024-12-12 PROCEDURE — 27201423 OPTIME MED/SURG SUP & DEVICES STERILE SUPPLY: Performed by: OBSTETRICS & GYNECOLOGY

## 2024-12-12 PROCEDURE — 36000710: Performed by: OBSTETRICS & GYNECOLOGY

## 2024-12-12 PROCEDURE — 71000033 HC RECOVERY, INTIAL HOUR: Performed by: OBSTETRICS & GYNECOLOGY

## 2024-12-12 PROCEDURE — 25000003 PHARM REV CODE 250: Performed by: OBSTETRICS & GYNECOLOGY

## 2024-12-12 PROCEDURE — 93010 ELECTROCARDIOGRAM REPORT: CPT | Mod: ,,, | Performed by: INTERNAL MEDICINE

## 2024-12-12 PROCEDURE — 63600175 PHARM REV CODE 636 W HCPCS: Performed by: ANESTHESIOLOGY

## 2024-12-12 PROCEDURE — 93005 ELECTROCARDIOGRAM TRACING: CPT

## 2024-12-12 PROCEDURE — 36000711: Performed by: OBSTETRICS & GYNECOLOGY

## 2024-12-12 PROCEDURE — 37000008 HC ANESTHESIA 1ST 15 MINUTES: Performed by: OBSTETRICS & GYNECOLOGY

## 2024-12-12 PROCEDURE — 71000039 HC RECOVERY, EACH ADD'L HOUR: Performed by: OBSTETRICS & GYNECOLOGY

## 2024-12-12 PROCEDURE — 71000015 HC POSTOP RECOV 1ST HR: Performed by: OBSTETRICS & GYNECOLOGY

## 2024-12-12 PROCEDURE — 63600175 PHARM REV CODE 636 W HCPCS: Performed by: NURSE ANESTHETIST, CERTIFIED REGISTERED

## 2024-12-12 RX ORDER — SODIUM CHLORIDE 9 MG/ML
INJECTION, SOLUTION INTRAVENOUS CONTINUOUS PRN
Status: DISCONTINUED | OUTPATIENT
Start: 2024-12-12 | End: 2024-12-12

## 2024-12-12 RX ORDER — BISACODYL 10 MG/1
10 SUPPOSITORY RECTAL DAILY PRN
Status: DISCONTINUED | OUTPATIENT
Start: 2024-12-12 | End: 2024-12-12 | Stop reason: HOSPADM

## 2024-12-12 RX ORDER — MEPERIDINE HYDROCHLORIDE 25 MG/ML
12.5 INJECTION INTRAMUSCULAR; INTRAVENOUS; SUBCUTANEOUS EVERY 10 MIN PRN
Status: DISCONTINUED | OUTPATIENT
Start: 2024-12-12 | End: 2024-12-12 | Stop reason: HOSPADM

## 2024-12-12 RX ORDER — MUPIROCIN 20 MG/G
OINTMENT TOPICAL 2 TIMES DAILY
Status: DISCONTINUED | OUTPATIENT
Start: 2024-12-12 | End: 2024-12-12 | Stop reason: HOSPADM

## 2024-12-12 RX ORDER — FAMOTIDINE 10 MG/ML
20 INJECTION INTRAVENOUS EVERY 12 HOURS
Status: DISCONTINUED | OUTPATIENT
Start: 2024-12-12 | End: 2024-12-12 | Stop reason: HOSPADM

## 2024-12-12 RX ORDER — FAMOTIDINE 20 MG/1
20 TABLET, FILM COATED ORAL
Status: COMPLETED | OUTPATIENT
Start: 2024-12-12 | End: 2024-12-12

## 2024-12-12 RX ORDER — MUPIROCIN 20 MG/G
OINTMENT TOPICAL
Status: DISCONTINUED | OUTPATIENT
Start: 2024-12-12 | End: 2024-12-12 | Stop reason: HOSPADM

## 2024-12-12 RX ORDER — PROPOFOL 10 MG/ML
VIAL (ML) INTRAVENOUS
Status: DISCONTINUED | OUTPATIENT
Start: 2024-12-12 | End: 2024-12-12

## 2024-12-12 RX ORDER — IBUPROFEN 600 MG/1
600 TABLET ORAL EVERY 6 HOURS
Status: DISCONTINUED | OUTPATIENT
Start: 2024-12-13 | End: 2024-12-12 | Stop reason: HOSPADM

## 2024-12-12 RX ORDER — SCOLOPAMINE TRANSDERMAL SYSTEM 1 MG/1
1 PATCH, EXTENDED RELEASE TRANSDERMAL
Status: DISCONTINUED | OUTPATIENT
Start: 2024-12-12 | End: 2024-12-12 | Stop reason: HOSPADM

## 2024-12-12 RX ORDER — SODIUM CHLORIDE, SODIUM GLUCONATE, SODIUM ACETATE, POTASSIUM CHLORIDE AND MAGNESIUM CHLORIDE 30; 37; 368; 526; 502 MG/100ML; MG/100ML; MG/100ML; MG/100ML; MG/100ML
INJECTION, SOLUTION INTRAVENOUS CONTINUOUS
Status: DISCONTINUED | OUTPATIENT
Start: 2024-12-12 | End: 2024-12-12 | Stop reason: HOSPADM

## 2024-12-12 RX ORDER — HYDROMORPHONE HYDROCHLORIDE 2 MG/ML
INJECTION, SOLUTION INTRAMUSCULAR; INTRAVENOUS; SUBCUTANEOUS
Status: DISCONTINUED | OUTPATIENT
Start: 2024-12-12 | End: 2024-12-12

## 2024-12-12 RX ORDER — PROCHLORPERAZINE EDISYLATE 5 MG/ML
5 INJECTION INTRAMUSCULAR; INTRAVENOUS EVERY 30 MIN PRN
Status: DISCONTINUED | OUTPATIENT
Start: 2024-12-12 | End: 2024-12-12 | Stop reason: HOSPADM

## 2024-12-12 RX ORDER — OXYCODONE AND ACETAMINOPHEN 10; 325 MG/1; MG/1
1 TABLET ORAL EVERY 4 HOURS PRN
Status: DISCONTINUED | OUTPATIENT
Start: 2024-12-12 | End: 2024-12-12 | Stop reason: HOSPADM

## 2024-12-12 RX ORDER — ACETAMINOPHEN 500 MG
1000 TABLET ORAL
Status: COMPLETED | OUTPATIENT
Start: 2024-12-12 | End: 2024-12-12

## 2024-12-12 RX ORDER — LOPERAMIDE HYDROCHLORIDE 2 MG/1
4 CAPSULE ORAL ONCE
Status: DISCONTINUED | OUTPATIENT
Start: 2024-12-12 | End: 2024-12-12 | Stop reason: HOSPADM

## 2024-12-12 RX ORDER — LACTULOSE 10 G/15ML
20 SOLUTION ORAL EVERY 6 HOURS PRN
Status: DISCONTINUED | OUTPATIENT
Start: 2024-12-12 | End: 2024-12-12 | Stop reason: HOSPADM

## 2024-12-12 RX ORDER — HYDROMORPHONE HYDROCHLORIDE 2 MG/ML
1 INJECTION, SOLUTION INTRAMUSCULAR; INTRAVENOUS; SUBCUTANEOUS EVERY 6 HOURS PRN
Status: DISCONTINUED | OUTPATIENT
Start: 2024-12-12 | End: 2024-12-12 | Stop reason: HOSPADM

## 2024-12-12 RX ORDER — EPHEDRINE SULFATE 50 MG/ML
INJECTION, SOLUTION INTRAVENOUS
Status: DISCONTINUED | OUTPATIENT
Start: 2024-12-12 | End: 2024-12-12

## 2024-12-12 RX ORDER — MIDAZOLAM HYDROCHLORIDE 1 MG/ML
INJECTION INTRAMUSCULAR; INTRAVENOUS
Status: DISCONTINUED | OUTPATIENT
Start: 2024-12-12 | End: 2024-12-12

## 2024-12-12 RX ORDER — CEFAZOLIN SODIUM 1 G/3ML
2 INJECTION, POWDER, FOR SOLUTION INTRAMUSCULAR; INTRAVENOUS
Status: COMPLETED | OUTPATIENT
Start: 2024-12-12 | End: 2024-12-12

## 2024-12-12 RX ORDER — DIPHENHYDRAMINE HYDROCHLORIDE 50 MG/ML
25 INJECTION INTRAMUSCULAR; INTRAVENOUS EVERY 6 HOURS PRN
Status: DISCONTINUED | OUTPATIENT
Start: 2024-12-12 | End: 2024-12-12 | Stop reason: HOSPADM

## 2024-12-12 RX ORDER — GLUCAGON 1 MG
1 KIT INJECTION
Status: DISCONTINUED | OUTPATIENT
Start: 2024-12-12 | End: 2024-12-12 | Stop reason: HOSPADM

## 2024-12-12 RX ORDER — KETOROLAC TROMETHAMINE 30 MG/ML
30 INJECTION, SOLUTION INTRAMUSCULAR; INTRAVENOUS EVERY 8 HOURS
Status: DISCONTINUED | OUTPATIENT
Start: 2024-12-12 | End: 2024-12-12 | Stop reason: HOSPADM

## 2024-12-12 RX ORDER — SODIUM CHLORIDE 9 MG/ML
INJECTION, SOLUTION INTRAVENOUS CONTINUOUS
Status: DISCONTINUED | OUTPATIENT
Start: 2024-12-12 | End: 2024-12-12 | Stop reason: HOSPADM

## 2024-12-12 RX ORDER — DEXAMETHASONE SODIUM PHOSPHATE 4 MG/ML
INJECTION, SOLUTION INTRA-ARTICULAR; INTRALESIONAL; INTRAMUSCULAR; INTRAVENOUS; SOFT TISSUE
Status: DISCONTINUED | OUTPATIENT
Start: 2024-12-12 | End: 2024-12-12

## 2024-12-12 RX ORDER — DIPHENHYDRAMINE HCL 25 MG
25 CAPSULE ORAL EVERY 4 HOURS PRN
Status: DISCONTINUED | OUTPATIENT
Start: 2024-12-12 | End: 2024-12-12 | Stop reason: HOSPADM

## 2024-12-12 RX ORDER — ONDANSETRON HYDROCHLORIDE 2 MG/ML
INJECTION, SOLUTION INTRAVENOUS
Status: DISCONTINUED | OUTPATIENT
Start: 2024-12-12 | End: 2024-12-12

## 2024-12-12 RX ORDER — SODIUM CITRATE AND CITRIC ACID MONOHYDRATE 334; 500 MG/5ML; MG/5ML
30 SOLUTION ORAL ONCE
Status: COMPLETED | OUTPATIENT
Start: 2024-12-12 | End: 2024-12-12

## 2024-12-12 RX ORDER — ROCURONIUM BROMIDE 10 MG/ML
INJECTION, SOLUTION INTRAVENOUS
Status: DISCONTINUED | OUTPATIENT
Start: 2024-12-12 | End: 2024-12-12

## 2024-12-12 RX ORDER — LIDOCAINE HYDROCHLORIDE 10 MG/ML
1 INJECTION, SOLUTION EPIDURAL; INFILTRATION; INTRACAUDAL; PERINEURAL ONCE
Status: DISCONTINUED | OUTPATIENT
Start: 2024-12-12 | End: 2024-12-12 | Stop reason: HOSPADM

## 2024-12-12 RX ORDER — FENTANYL CITRATE 50 UG/ML
INJECTION, SOLUTION INTRAMUSCULAR; INTRAVENOUS
Status: DISCONTINUED | OUTPATIENT
Start: 2024-12-12 | End: 2024-12-12

## 2024-12-12 RX ORDER — HYDROMORPHONE HYDROCHLORIDE 2 MG/ML
0.2 INJECTION, SOLUTION INTRAMUSCULAR; INTRAVENOUS; SUBCUTANEOUS EVERY 5 MIN PRN
Status: DISCONTINUED | OUTPATIENT
Start: 2024-12-12 | End: 2024-12-12 | Stop reason: HOSPADM

## 2024-12-12 RX ORDER — OXYCODONE AND ACETAMINOPHEN 5; 325 MG/1; MG/1
1 TABLET ORAL EVERY 4 HOURS PRN
Status: DISCONTINUED | OUTPATIENT
Start: 2024-12-12 | End: 2024-12-12 | Stop reason: HOSPADM

## 2024-12-12 RX ORDER — OXYCODONE AND ACETAMINOPHEN 5; 325 MG/1; MG/1
1 TABLET ORAL EVERY 6 HOURS PRN
Qty: 28 TABLET | Refills: 0 | Status: SHIPPED | OUTPATIENT
Start: 2024-12-12

## 2024-12-12 RX ORDER — IPRATROPIUM BROMIDE AND ALBUTEROL SULFATE 2.5; .5 MG/3ML; MG/3ML
3 SOLUTION RESPIRATORY (INHALATION)
Status: DISCONTINUED | OUTPATIENT
Start: 2024-12-12 | End: 2024-12-12 | Stop reason: HOSPADM

## 2024-12-12 RX ORDER — ONDANSETRON 4 MG/1
8 TABLET, ORALLY DISINTEGRATING ORAL EVERY 8 HOURS PRN
Status: DISCONTINUED | OUTPATIENT
Start: 2024-12-12 | End: 2024-12-12 | Stop reason: HOSPADM

## 2024-12-12 RX ORDER — ONDANSETRON HYDROCHLORIDE 2 MG/ML
4 INJECTION, SOLUTION INTRAVENOUS EVERY 6 HOURS PRN
Status: DISCONTINUED | OUTPATIENT
Start: 2024-12-12 | End: 2024-12-12 | Stop reason: HOSPADM

## 2024-12-12 RX ORDER — DIPHENHYDRAMINE HYDROCHLORIDE 50 MG/ML
25 INJECTION INTRAMUSCULAR; INTRAVENOUS EVERY 4 HOURS PRN
Status: DISCONTINUED | OUTPATIENT
Start: 2024-12-12 | End: 2024-12-12 | Stop reason: HOSPADM

## 2024-12-12 RX ORDER — ONDANSETRON HYDROCHLORIDE 2 MG/ML
4 INJECTION, SOLUTION INTRAVENOUS DAILY PRN
Status: DISCONTINUED | OUTPATIENT
Start: 2024-12-12 | End: 2024-12-12 | Stop reason: HOSPADM

## 2024-12-12 RX ORDER — LIDOCAINE HYDROCHLORIDE 20 MG/ML
INJECTION INTRAVENOUS
Status: DISCONTINUED | OUTPATIENT
Start: 2024-12-12 | End: 2024-12-12

## 2024-12-12 RX ORDER — MIDAZOLAM HYDROCHLORIDE 2 MG/2ML
2 INJECTION, SOLUTION INTRAMUSCULAR; INTRAVENOUS
Status: ACTIVE | OUTPATIENT
Start: 2024-12-12 | End: 2024-12-12

## 2024-12-12 RX ORDER — BUPIVACAINE HYDROCHLORIDE 2.5 MG/ML
INJECTION, SOLUTION EPIDURAL; INFILTRATION; INTRACAUDAL
Status: DISCONTINUED | OUTPATIENT
Start: 2024-12-12 | End: 2024-12-12 | Stop reason: HOSPADM

## 2024-12-12 RX ADMIN — PROPOFOL 160 MG: 10 INJECTION, EMULSION INTRAVENOUS at 07:12

## 2024-12-12 RX ADMIN — SODIUM CHLORIDE: 9 INJECTION, SOLUTION INTRAVENOUS at 07:12

## 2024-12-12 RX ADMIN — HYDROMORPHONE HYDROCHLORIDE 0.4 MG: 2 INJECTION, SOLUTION INTRAMUSCULAR; INTRAVENOUS; SUBCUTANEOUS at 09:12

## 2024-12-12 RX ADMIN — ONDANSETRON 4 MG: 2 INJECTION INTRAMUSCULAR; INTRAVENOUS at 09:12

## 2024-12-12 RX ADMIN — LIDOCAINE HYDROCHLORIDE 80 MG: 20 INJECTION INTRAVENOUS at 07:12

## 2024-12-12 RX ADMIN — EPHEDRINE SULFATE 10 MG: 50 INJECTION INTRAVENOUS at 08:12

## 2024-12-12 RX ADMIN — KETOROLAC TROMETHAMINE 30 MG: 30 INJECTION, SOLUTION INTRAMUSCULAR at 02:12

## 2024-12-12 RX ADMIN — SCOPOLAMINE 1 PATCH: 1 PATCH TRANSDERMAL at 06:12

## 2024-12-12 RX ADMIN — ROCURONIUM BROMIDE 60 MG: 10 SOLUTION INTRAVENOUS at 07:12

## 2024-12-12 RX ADMIN — SODIUM CITRATE AND CITRIC ACID MONOHYDRATE 30 ML: 500; 334 SOLUTION ORAL at 06:12

## 2024-12-12 RX ADMIN — ACETAMINOPHEN 1000 MG: 500 TABLET ORAL at 06:12

## 2024-12-12 RX ADMIN — ONDANSETRON 4 MG: 2 INJECTION INTRAMUSCULAR; INTRAVENOUS at 08:12

## 2024-12-12 RX ADMIN — FENTANYL CITRATE 100 MCG: 50 INJECTION, SOLUTION INTRAMUSCULAR; INTRAVENOUS at 07:12

## 2024-12-12 RX ADMIN — MUPIROCIN: 20 OINTMENT TOPICAL at 06:12

## 2024-12-12 RX ADMIN — MIDAZOLAM HYDROCHLORIDE 2 MG: 1 INJECTION, SOLUTION INTRAMUSCULAR; INTRAVENOUS at 07:12

## 2024-12-12 RX ADMIN — FAMOTIDINE 20 MG: 20 TABLET, FILM COATED ORAL at 06:12

## 2024-12-12 RX ADMIN — DEXAMETHASONE SODIUM PHOSPHATE 4 MG: 4 INJECTION, SOLUTION INTRA-ARTICULAR; INTRALESIONAL; INTRAMUSCULAR; INTRAVENOUS; SOFT TISSUE at 07:12

## 2024-12-12 RX ADMIN — CEFAZOLIN 2 G: 330 INJECTION, POWDER, FOR SOLUTION INTRAMUSCULAR; INTRAVENOUS at 07:12

## 2024-12-12 RX ADMIN — SUGAMMADEX 200 MG: 100 INJECTION, SOLUTION INTRAVENOUS at 09:12

## 2024-12-12 NOTE — ANESTHESIA PREPROCEDURE EVALUATION
2024  Latonya Douglass is a 44 y.o., female.    Pre-op Diagnosis: Other specified irregular menstruation [N92.5]    Procedure(s):  HYSTERECTOMY, VAGINAL, LAPAROSCOPY-ASSISTED     Review of patient's allergies indicates:  No Known Allergies    Current Outpatient Medications   Medication Instructions    cyanocobalamin 1,000 mcg/mL injection 1 mL, Intramuscular, Weekly    ferrous sulfate, dried (IRON, DRIED, ORAL) Oral    ketoconazole (NIZORAL) 2 % shampoo 1 application , Topical (Top), Weekly    lactulose 10 gram/15 ml (CHRONULAC) 10 gram/15 mL (15 mL) solution SMARTSI Gram(s) By Mouth 3 Times Daily    MOUNJARO 5 mg/0.5 mL PnIj     norgestimate-ethinyl estradioL (ORTHO-CYCLEN) 0.25-35 mg-mcg per tablet 1 tablet, Oral, Daily    omeprazole (PRILOSEC) 40 mg, Oral, Every morning    sucralfate (CARAFATE) 1 g, Oral, 3 times daily    thyroid, pork, (NP THYROID) 15 mg Tab Oral, Before breakfast    TRINTELLIX 10 mg Tab 1 tablet, Oral, Daily    vitamin D (VITAMIN D3) 1,000 Units, Oral, Daily   LAST MOUNJARO 2 weeks ago    ID LAP,VAG HYST,UTERUS 250GMS/<,SALP-OOPH [46505] (HYSTEREC*    Past Medical History:   Diagnosis Date    Cobalamin deficiency 2023    Digestive disorder     Obesity 2023    Vitamin D deficiency 2023       Past Surgical History:   Procedure Laterality Date    ANTERIOR CRUCIATE LIGAMENT REPAIR Right      Lab Results   Component Value Date    WBC 6.80 2024    HGB 13.7 2024    HCT 40.7 2024    MCV 92.7 2024     2024          BMP  Lab Results   Component Value Date     2024    K 4.3 2024     2024    CO2 28 2024    BUN 12.4 2024    CREATININE 0.79 2024    CALCIUM 8.4 2024                   Pre-op Assessment    I have reviewed the Patient Summary Reports.    I have reviewed the NPO Status.   I  have reviewed the Medications.     Review of Systems  Anesthesia Hx:  No problems with previous Anesthesia             Denies Family Hx of Anesthesia complications.    Denies Personal Hx of Anesthesia complications.                    Social:  Non-Smoker       Cardiovascular:  Exercise tolerance: good          Denies Angina.   Denies Orthopnea.  Denies PND.    Denies HU.      Functional Capacity good / => 4 METS                         Hepatic/GI:     GERD                Musculoskeletal:  Musculoskeletal Normal                Neurological:  Denies TIA.  Denies CVA.                                    Endocrine:        Obesity / BMI > 30  Psych:  Psychiatric Normal                    Physical Exam  General: Well nourished, Alert and Oriented    Airway:  Mallampati: III   Mouth Opening: Normal  TM Distance: Normal  Tongue: Normal  Neck ROM: Normal ROM    Dental:  Intact    Chest/Lungs:  Clear to auscultation    Heart:  Rate: Normal  Rhythm: Regular Rhythm  No pretibial edema  No carotid bruits      Anesthesia Plan  Type of Anesthesia, risks & benefits discussed:    Anesthesia Type: Gen ETT  Intra-op Monitoring Plan: Standard ASA Monitors  Post Op Pain Control Plan: multimodal analgesia  Induction:  IV  Airway Plan: Direct, Post-Induction  Informed Consent: Informed consent signed with the Patient and all parties understand the risks and agree with anesthesia plan.  All questions answered. Patient consented to blood products? Yes  ASA Score: 2  Day of Surgery Review of History & Physical: H&P Update referred to the surgeon/provider.    Ready For Surgery From Anesthesia Perspective.     .

## 2024-12-12 NOTE — TRANSFER OF CARE
"Anesthesia Transfer of Care Note    Patient: Latonya Douglass    Procedure(s) Performed: Procedure(s) (LRB):  HYSTERECTOMY, VAGINAL, LAPAROSCOPY-ASSISTED (N/A)    Patient location: PACU    Anesthesia Type: general    Transport from OR: Transported from OR on room air with adequate spontaneous ventilation    Post pain: adequate analgesia    Post assessment: no apparent anesthetic complications    Post vital signs: stable    Level of consciousness: responds to stimulation    Nausea/Vomiting: no nausea/vomiting    Complications: none    Transfer of care protocol was followed      Last vitals: Visit Vitals  /65   Pulse 88   Temp 36 °C (96.8 °F)   Resp 16   Ht 5' 9" (1.753 m)   Wt 106 kg (233 lb 11 oz)   SpO2 97%   Breastfeeding No   BMI 34.51 kg/m²     "

## 2024-12-12 NOTE — ANESTHESIA PROCEDURE NOTES
Intubation    Date/Time: 12/12/2024 7:33 AM    Performed by: Dabadie, Virginia G, CRNA  Authorized by: Fredrick Walters MD    Intubation:     Induction:  Intravenous    Intubated:  Postinduction    Mask Ventilation:  Easy mask    Attempts:  2    Attempted By:  Student (ERIN Tsang)    Method of Intubation:  Direct    Blade:  Morales 2    Laryngeal View Grade: Grade III - only epiglottis visible      Attempted By (2nd Attempt):  Student    Method of Intubation (2nd Attempt):  Video laryngoscopy    Blade (2nd Attempt):  Carvalho 3    Laryngeal View Grade (2nd Attempt): Grade I - full view of cords      Difficult Airway Encountered?: No      Complications:  None    Airway Device:  Oral endotracheal tube    Airway Device Size:  7.0    Style/Cuff Inflation:  Cuffed (inflated to minimal occlusive pressure)    Tube secured:  22    Secured at:  The teeth    Placement Verified By:  Capnometry    Complicating Factors:  None    Findings Post-Intubation:  BS equal bilateral

## 2024-12-12 NOTE — DISCHARGE INSTRUCTIONS
-NO driving for 24 hours and while taking narcotic pain medication.    -Keep sites clean and dry for 48 hours. OK to shower afterwards. Do not tub bathe or submerge incision under water.    -Pelvic rest for 2 weeks or until instructed otherwise by your MD. Pelvic rest includes no heavy lifting, no tampons, no intercourse.     -Monitor sites for infection: redness, swelling, fever, chills, drainage/pus/foul odor.    -Minimal bleeding is expected. Notify your provider if soaking through pad < 1hour.    -Report to your nearest ER/Notify provider if you experience any SUDDEN/SEVERE chest pain, abdominal pain, weakness, trouble breathing, uncontrolled pain/bleeding.    BLEEDING: if you experience uncontrollable bleeding, contact your doctor and go to ER.    NAUSEA: due to the anesthesia, you may experience nausea for up to 24 hours. If nausea and vomiting last longer, contact your doctor.     INFECTION:  watch for any signs or symptoms of infection such as chills, fever, redness or drainage at surgical site. Notify your doctor.     PAIN : take your pain medications as directed. If the pain medications are not helping, notify your doctor.

## 2024-12-12 NOTE — OP NOTE
[facility]                       [address] [address]    PATIENT NAME:      WILIAN FINNEGAN  YOB: 1980  CSN:               [id number]  MRN:               [id number]  ADMIT DATE:        12/12/2024 05:25:00  PHYSICIAN:         Jaxson Doan Jr, MD                          OPERATIVE REPORT      DATE OF SURGERY:    12/12/2024 00:00:00    SURGEON:  Jaxson Doan Jr, MD    NAME OF OPERATION:  [facility].    ASSISTANT:  Camerno Doan MD    The patient was taken to the operating room where general anesthesia was   administered and found to be adequate.  Abdomen, perineum, vagina were prepped   and draped in usual sterile fashion.  A red rubber catheter inserted to the   bladder, 115 mL of clear urine were obtained.  Infraumbilical skin incision made   with a knife.  A 5 mm trocar was inserted directly to the abdomen.    Pneumoperitoneum was created with 2.3 L of CO2 gas.  The right lateral port and   left lateral port was also placed without difficulty.  Patient placed in slight   Trendelenburg.  Visualization of the uterus, ovaries, fallopian tubes, no gross   abnormalities were noted.  The patient had desired ovarian preservation and the   rest of the abdominal pelvic survey was noted to be normal.  A LigaSure was   inserted through the lateral port and a grasper was inserted through the other   port.  The round ligaments were then transected without difficulty.  The   dissection through the utero-ovarian ligaments was made with the LigaSure.  All   bites were double burned and cauterized without difficulty.  Hemostasis was   ensured throughout.  The skeletonization was continued to the level of the   uterine arteries.  When the level of the uterine arteries were reached, the   uterine arteries were then cauterized with the LigaSure without difficulty.  A   bladder flap was then created with both sharp and blunt  dissection.  Once the   dissection was complete from above, attention was paid to the vaginal part.  An   acorn manipulator was removed from the cervical os.  Two triple tooth tenaculums   were placed both anterior and posterior.  The cervix was injected with 8 mL of   1% Marcaine in a circumferential fashion.  It was then scored from the 12 to 12   o'clock position.  The dissection was continued anterior to the vesicouterine   peritoneum.  The peritoneum was entered.  Visualization was confirmed with   visualization of the anterior surface of the uterus.  A posterior colpotomy was   then made.  A stay suture was placed to the perineum.  The uterosacral ligaments   were then double clamped with curved Alexandre clamps.  Pedicles were cut, tied   with 1 Vicryl.  Once the dissection from below reached the dissection from   above, the specimen was removed in its entirety.  The vaginal cuff was closed   with interrupted [id number] with incorporation of the uterosacrals into   the vaginal angles.  Attention was then paid to the abdominal part.  Again, a   pneumoperitoneum was recreated.  Visualization of the operative field as well as   rest of the bladder was noted to be hemostatic.  No bleeding.  No other   abnormalities noted.  Pneumoperitoneum was allowed to escape.  Puncture sites on   the belly were closed with a 4-0 Monocryl.  The patient tolerated the procedure   well.    COUNT:  Needle, sponge, lap counts were correct x2.    BLOOD LOSS:  50 mL.        ______________________________  MD JACKY Meehan Jr/AQS  DD:  12/12/2024  Time:  09:48AM  DT:  12/12/2024  Time:  10:09AM  Job #:  [id number]      OPERATIVE REPORT

## 2024-12-12 NOTE — ANESTHESIA POSTPROCEDURE EVALUATION
Anesthesia Post Evaluation    Patient: Latonya Douglass    Procedure(s) Performed: Procedure(s) (LRB):  HYSTERECTOMY, VAGINAL, LAPAROSCOPY-ASSISTED (N/A)    Final Anesthesia Type: general      Patient location during evaluation: PACU  Patient participation: Yes- Able to Participate  Level of consciousness: awake and alert and oriented  Post-procedure vital signs: reviewed and stable  Pain management: adequate  Airway patency: patent    PONV status at discharge: No PONV  Anesthetic complications: no      Cardiovascular status: hemodynamically stable  Respiratory status: unassisted  Hydration status: euvolemic  Follow-up not needed.              Vitals Value Taken Time   /79 12/12/24 1137   Temp 36.3 °C (97.4 °F) 12/12/24 1037   Pulse 90 12/12/24 1145   Resp 17 12/12/24 1059   SpO2 99 % 12/12/24 1145         Event Time   Out of Recovery 10:30:00         Pain/Mariaelena Score: Pain Rating Prior to Med Admin: 0 (12/12/2024  6:22 AM)  Mariaelena Score: 9 (12/12/2024 10:52 AM)

## 2024-12-13 VITALS
RESPIRATION RATE: 20 BRPM | BODY MASS INDEX: 34.61 KG/M2 | TEMPERATURE: 97 F | OXYGEN SATURATION: 99 % | SYSTOLIC BLOOD PRESSURE: 121 MMHG | HEART RATE: 81 BPM | HEIGHT: 69 IN | WEIGHT: 233.69 LBS | DIASTOLIC BLOOD PRESSURE: 74 MMHG

## 2024-12-13 LAB
OHS QRS DURATION: 76 MS
OHS QTC CALCULATION: 431 MS

## 2024-12-16 LAB — PSYCHE PATHOLOGY RESULT: NORMAL

## 2024-12-16 NOTE — DISCHARGE SUMMARY
[facility]                       [address] [address]    PATIENT NAME:       WILIAN FINNEGAN  YOB: 1980  CSN:                [id number]   MRN:                [id number]  ADMIT DATE:         12/12/2024 05:25:00  PHYSICIAN:          Jaxson Doan Jr, MD                          DISCHARGE SUMMARY    DATE OF DISCHARGE:  12/12/2024 15:16:00    DIAGNOSIS:  Status post laparoscopic-assisted vaginal hysterectomy with   bilateral salpingo-oophorectomy.    HOSPITAL COURSE:  Patient underwent the procedure without incident.  She was   admitted to same-day surgery where she had a speedy and uneventful recovery.    She ambulated, tolerated diet without difficulty.  Her vitals were stable.  She   was afebrile.  She had normal bladder function.  She will be discharged home.    CONDITION:  Stable.    DIET:  Regular.    ACTIVITY:  Pelvic rest.    MEDICATIONS:    1. Percocet p.r.n.  2. Motrin p.r.n.    FOLLOWUP:  With Franki in 3 weeks.        ______________________________  Jaxson Doan Jr, MD    DJE/AQS  DD:  12/16/2024  Time:  08:03AM  DT:  12/16/2024  Time:  08:08AM  Job #:  [id number]      DISCHARGE SUMMARY

## 2024-12-20 ENCOUNTER — HOSPITAL ENCOUNTER (EMERGENCY)
Facility: HOSPITAL | Age: 44
Discharge: HOME | End: 2024-12-20
Attending: INTERNAL MEDICINE
Payer: COMMERCIAL

## 2024-12-20 VITALS
TEMPERATURE: 99 F | RESPIRATION RATE: 18 BRPM | OXYGEN SATURATION: 98 % | SYSTOLIC BLOOD PRESSURE: 128 MMHG | DIASTOLIC BLOOD PRESSURE: 70 MMHG | HEART RATE: 86 BPM | BODY MASS INDEX: 34.11 KG/M2 | WEIGHT: 231 LBS

## 2024-12-20 DIAGNOSIS — N30.00 ACUTE CYSTITIS WITHOUT HEMATURIA: Primary | ICD-10-CM

## 2024-12-20 LAB
ALBUMIN SERPL-MCNC: 3.4 G/DL (ref 3.5–5)
ALBUMIN/GLOB SERPL: 1 RATIO (ref 1.1–2)
ALP SERPL-CCNC: 68 UNIT/L (ref 40–150)
ALT SERPL-CCNC: 22 UNIT/L (ref 0–55)
ANION GAP SERPL CALC-SCNC: 7 MEQ/L
AST SERPL-CCNC: 22 UNIT/L (ref 5–34)
BACTERIA #/AREA URNS AUTO: ABNORMAL /HPF
BASOPHILS # BLD AUTO: 0.03 X10(3)/MCL
BASOPHILS NFR BLD AUTO: 0.2 %
BILIRUB SERPL-MCNC: 0.3 MG/DL
BILIRUB UR QL STRIP.AUTO: NEGATIVE
BUN SERPL-MCNC: 12 MG/DL (ref 7–18.7)
CALCIUM SERPL-MCNC: 9.1 MG/DL (ref 8.4–10.2)
CHLORIDE SERPL-SCNC: 107 MMOL/L (ref 98–107)
CLARITY UR: CLEAR
CO2 SERPL-SCNC: 24 MMOL/L (ref 22–29)
COLOR UR AUTO: YELLOW
CREAT SERPL-MCNC: 0.88 MG/DL (ref 0.55–1.02)
CREAT/UREA NIT SERPL: 14
EOSINOPHIL # BLD AUTO: 0.04 X10(3)/MCL (ref 0–0.9)
EOSINOPHIL NFR BLD AUTO: 0.3 %
ERYTHROCYTE [DISTWIDTH] IN BLOOD BY AUTOMATED COUNT: 12.8 % (ref 11.5–17)
GFR SERPLBLD CREATININE-BSD FMLA CKD-EPI: >60 ML/MIN/1.73/M2
GLOBULIN SER-MCNC: 3.4 GM/DL (ref 2.4–3.5)
GLUCOSE SERPL-MCNC: 111 MG/DL (ref 74–100)
GLUCOSE UR QL STRIP: NEGATIVE
HCT VFR BLD AUTO: 38 % (ref 37–47)
HGB BLD-MCNC: 12.7 G/DL (ref 12–16)
HGB UR QL STRIP: ABNORMAL
IMM GRANULOCYTES # BLD AUTO: 0.05 X10(3)/MCL (ref 0–0.04)
IMM GRANULOCYTES NFR BLD AUTO: 0.4 %
KETONES UR QL STRIP: NEGATIVE
LEUKOCYTE ESTERASE UR QL STRIP: ABNORMAL
LYMPHOCYTES # BLD AUTO: 0.99 X10(3)/MCL (ref 0.6–4.6)
LYMPHOCYTES NFR BLD AUTO: 8 %
MAGNESIUM SERPL-MCNC: 1.9 MG/DL (ref 1.6–2.6)
MCH RBC QN AUTO: 31.1 PG (ref 27–31)
MCHC RBC AUTO-ENTMCNC: 33.4 G/DL (ref 33–36)
MCV RBC AUTO: 92.9 FL (ref 80–94)
MONOCYTES # BLD AUTO: 0.75 X10(3)/MCL (ref 0.1–1.3)
MONOCYTES NFR BLD AUTO: 6.1 %
MUCOUS THREADS URNS QL MICRO: ABNORMAL /LPF
NEUTROPHILS # BLD AUTO: 10.53 X10(3)/MCL (ref 2.1–9.2)
NEUTROPHILS NFR BLD AUTO: 85 %
NITRITE UR QL STRIP: NEGATIVE
PH UR STRIP: 6 [PH]
PLATELET # BLD AUTO: 218 X10(3)/MCL (ref 130–400)
PMV BLD AUTO: 9.3 FL (ref 7.4–10.4)
POTASSIUM SERPL-SCNC: 4.2 MMOL/L (ref 3.5–5.1)
PROT SERPL-MCNC: 6.8 GM/DL (ref 6.4–8.3)
PROT UR QL STRIP: NEGATIVE
RBC # BLD AUTO: 4.09 X10(6)/MCL (ref 4.2–5.4)
RBC #/AREA URNS AUTO: ABNORMAL /HPF
SODIUM SERPL-SCNC: 138 MMOL/L (ref 136–145)
SP GR UR STRIP.AUTO: 1.01 (ref 1–1.03)
SQUAMOUS #/AREA URNS AUTO: ABNORMAL /HPF
UROBILINOGEN UR STRIP-ACNC: 2
WBC # BLD AUTO: 12.39 X10(3)/MCL (ref 4.5–11.5)
WBC #/AREA URNS AUTO: ABNORMAL /HPF

## 2024-12-20 PROCEDURE — 99284 EMERGENCY DEPT VISIT MOD MDM: CPT | Mod: 25

## 2024-12-20 PROCEDURE — 83735 ASSAY OF MAGNESIUM: CPT

## 2024-12-20 PROCEDURE — 85025 COMPLETE CBC W/AUTO DIFF WBC: CPT

## 2024-12-20 PROCEDURE — 80053 COMPREHEN METABOLIC PANEL: CPT

## 2024-12-20 PROCEDURE — 63600175 PHARM REV CODE 636 W HCPCS

## 2024-12-20 PROCEDURE — 81003 URINALYSIS AUTO W/O SCOPE: CPT | Performed by: EMERGENCY MEDICINE

## 2024-12-20 PROCEDURE — 96372 THER/PROPH/DIAG INJ SC/IM: CPT

## 2024-12-20 RX ORDER — CEPHALEXIN 500 MG/1
500 CAPSULE ORAL 2 TIMES DAILY
Qty: 10 CAPSULE | Refills: 0 | Status: SHIPPED | OUTPATIENT
Start: 2024-12-20 | End: 2024-12-25

## 2024-12-20 RX ORDER — CEFTRIAXONE 1 G/1
1 INJECTION, POWDER, FOR SOLUTION INTRAMUSCULAR; INTRAVENOUS
Status: COMPLETED | OUTPATIENT
Start: 2024-12-20 | End: 2024-12-20

## 2024-12-20 RX ADMIN — CEFTRIAXONE SODIUM 1 G: 1 INJECTION, POWDER, FOR SOLUTION INTRAMUSCULAR; INTRAVENOUS at 06:12

## 2024-12-21 NOTE — ED PROVIDER NOTES
Encounter Date: 12/20/2024       History     Chief Complaint   Patient presents with    Post-op Problem     Pt states she had a hysterectomy about one week ago and today had fever of 101 and is having lower abd and rectal pain.     See MDM    The history is provided by the patient and a significant other. No  was used.     Review of patient's allergies indicates:  No Known Allergies  Past Medical History:   Diagnosis Date    Cobalamin deficiency 04/19/2023    Digestive disorder     Obesity 04/19/2023    Vitamin D deficiency 04/19/2023     Past Surgical History:   Procedure Laterality Date    ANTERIOR CRUCIATE LIGAMENT REPAIR Right 1994    LAPAROSCOPY-ASSISTED VAGINAL HYSTERECTOMY N/A 12/12/2024    Procedure: HYSTERECTOMY, VAGINAL, LAPAROSCOPY-ASSISTED;  Surgeon: Jaxson Doan Jr., MD;  Location: [facility];  Service: OB/GYN;  Laterality: N/A;  [facility]     Family History   Problem Relation Name Age of Onset    Heart failure Father      Diabetes type II Father      Melanoma Mother      Breast cancer Paternal Aunt      Cervical cancer Neg Hx      Uterine cancer Neg Hx      Ovarian cancer Neg Hx       Social History     Tobacco Use    Smoking status: Never    Smokeless tobacco: Never   Substance Use Topics    Alcohol use: Yes     Comment: social    Drug use: Never     Review of Systems   Constitutional:  Positive for fever.        Pain with urination   All other systems reviewed and are negative.      Physical Exam     Initial Vitals [12/20/24 1718]   BP Pulse Resp Temp SpO2   123/75 91 18 98.7 °F (37.1 °C) 99 %      MAP       --         Physical Exam    Nursing note and vitals reviewed.  Constitutional: She appears well-developed and well-nourished.   HENT:   Head: Normocephalic and atraumatic.   Eyes: EOM are normal.   Neck:   Normal range of motion.  Cardiovascular:  Normal rate and regular rhythm.           Pulmonary/Chest: Breath sounds normal.   Abdominal: Abdomen is soft. Bowel sounds are normal.  A surgical scar is present. There is generalized abdominal tenderness (Mild).   Hysterectomy Surgical sites clean dry and intact.  Patient with some mild abdominal pain with palpation which is to be expected following her procedure   Musculoskeletal:         General: Normal range of motion.      Cervical back: Normal range of motion.     Neurological: She is alert and oriented to person, place, and time.   Skin: Skin is warm and dry.   Psychiatric: She has a normal mood and affect.         ED Course   Procedures  Labs Reviewed   URINALYSIS, REFLEX TO URINE CULTURE - Abnormal       Result Value    Color, UA Yellow      Appearance, UA Clear      Specific Gravity, UA 1.015      pH, UA 6.0      Protein, UA Negative      Glucose, UA Negative      Ketones, UA Negative      Blood, UA 2+ (*)     Bilirubin, UA Negative      Urobilinogen, UA 2.0 (*)     Nitrites, UA Negative      Leukocyte Esterase, UA 1+ (*)    COMPREHENSIVE METABOLIC PANEL - Abnormal    Sodium 138      Potassium 4.2      Chloride 107      CO2 24      Glucose 111 (*)     Blood Urea Nitrogen 12.0      Creatinine 0.88      Calcium 9.1      Protein Total 6.8      Albumin 3.4 (*)     Globulin 3.4      Albumin/Globulin Ratio 1.0 (*)     Bilirubin Total 0.3      ALP 68      ALT 22      AST 22      eGFR >60      Anion Gap 7.0      BUN/Creatinine Ratio 14     CBC WITH DIFFERENTIAL - Abnormal    WBC 12.39 (*)     RBC 4.09 (*)     Hgb 12.7      Hct 38.0      MCV 92.9      MCH 31.1 (*)     MCHC 33.4      RDW 12.8      Platelet 218      MPV 9.3      Neut % 85.0      Lymph % 8.0      Mono % 6.1      Eos % 0.3      Basophil % 0.2      Lymph # 0.99      Neut # 10.53 (*)     Mono # 0.75      Eos # 0.04      Baso # 0.03      IG# 0.05 (*)     IG% 0.4     URINALYSIS, MICROSCOPIC - Abnormal    Bacteria, UA Few (*)     Mucous, UA Trace (*)     RBC, UA 0-5      WBC, UA 0-5      Squamous Epithelial Cells, UA Few (*)    MAGNESIUM - Normal    Magnesium Level 1.90     CBC W/ AUTO  DIFFERENTIAL    Narrative:     The following orders were created for panel order CBC Auto Differential.  Procedure                               Abnormality         Status                     ---------                               -----------         ------                     CBC with Differential[[id number]]       Abnormal            Final result                 Please view results for these tests on the individual orders.   COVID/FLU A&B PCR          Imaging Results    None          Medications   cefTRIAXone injection 1 g (1 g Intramuscular Given 12/20/24 1817)     Medical Decision Making  The patient is a 44 y.o. female with a pertinent PMHX of hysterectomy on 12/12/24 with Dr Doan who presents to the Emergency Department with her partner with a chief complaint of fever. Symptoms began today around 4:30 p.m. Associated symptoms include pain with urination. Symptoms are aggravated with nothing and alleviated with nothing. The patient denies hematuria, constipation, vomiting, nausea, sick contacts. They report taking Motrin prior to arrival with relief of symptoms. No other reported symptoms at this time.    Pertinent physical exam findings include hysterectomy surgical site scars which are clean dry and intact, patient has some mild abdominal TTP which is to be expected following her hysterectomy.  Vital signs WNL.    UA showing signs of infection.  CMP with no emergent findings.  CBC with mild leukocytosis with shift.    Laboratory findings discussed with patient.  Patient given Rocephin injection while in the ER and prescribed Keflex outpatient.  Patient verbalized understanding and agreement of plan.  Discharge instructions and return precautions provided.  All questions answered.      Amount and/or Complexity of Data Reviewed  Labs: ordered. Decision-making details documented in ED Course.    Risk  Prescription drug management.      Additional MDM:   Differential Diagnosis:   Symptom: Abdominal pain. <> The  follow diagnoses were considered and will be evaluated: Gastroenteritis and Urinary Tract Infection.   Other: The following diagnoses were also considered and will be evaluated: Surgical site infection.                                   Clinical Impression:  Final diagnoses:  [N30.00] Acute cystitis without hematuria (Primary)          ED Disposition Condition    Discharge Stable          ED Prescriptions       Medication Sig Dispense Start Date End Date Auth. Provider    cephALEXin (KEFLEX) 500 MG capsule Take 1 capsule (500 mg total) by mouth 2 (two) times a day. for 5 days 10 capsule 12/20/2024 12/25/2024 Carline Miranda PA-C          Follow-up Information       Follow up With Specialties Details Why Contact Info    Ady Rodríguez MD Internal Medicine Call in 1 week As needed [address] [address]  [phone]               Carline Miranda PA-C  12/20/24 [id number]

## 2024-12-21 NOTE — DISCHARGE INSTRUCTIONS
Take Antibiotics as directed.  Continue to take ibuprofen or Tylenol as needed for pain or fever.  If you experience any new or worsening symptoms return to emergency department.

## 2024-12-27 ENCOUNTER — NURSE TRIAGE (OUTPATIENT)
Dept: ADMINISTRATIVE | Facility: CLINIC | Age: 44
End: 2024-12-27
Payer: COMMERCIAL

## 2024-12-27 NOTE — TELEPHONE ENCOUNTER
"Pt states hysterectomy x2 weeks ago. Pt states last PM, vaginal discharge "changed". Pt states color was "dark purple." Pt denies fever or abdominal pain that is worsening. Per protocol, see physician when office is open. Pt states MD out of office x2 weeks. Discussed UC or ER. Advised to call back for any further questions or concerns.   Reason for Disposition   Abnormal color vaginal discharge (i.e., yellow, green, gray)    Additional Information   Negative: [1] SEVERE abdominal pain (e.g., excruciating) AND [2] present > 1 hour   Negative: Patient sounds very sick or weak to the triager   Negative: [1] Yellow or green vaginal discharge AND [2] fever   Negative: [1] Genital area looks infected (e.g., draining sore, spreading redness) AND [2] fever   Negative: [1] Constant abdominal pain AND [2] present > 2 hours   Negative: [1] Mild lower abdominal pain comes and goes (cramps) AND [2] lasts > 24 hours   Negative: Genital area looks infected (e.g., draining sore, spreading redness)   Negative: [1] Rash is tiny water blisters AND [2] 3 or more   Negative: [1] Rash (e.g., redness, tiny bumps, sore) of genital area AND [2] present > 24 hours   Negative: Bad smelling vaginal discharge    Protocols used: Vaginal Utincewjf-V-PE    "

## 2025-01-03 ENCOUNTER — HOSPITAL ENCOUNTER (OUTPATIENT)
Dept: RADIOLOGY | Facility: HOSPITAL | Age: 45
Discharge: HOME | End: 2025-01-03
Attending: INTERNAL MEDICINE
Payer: COMMERCIAL

## 2025-01-03 DIAGNOSIS — Z12.31 ENCOUNTER FOR SCREENING MAMMOGRAM FOR BREAST CANCER: ICD-10-CM

## 2025-01-03 PROCEDURE — 77067 SCR MAMMO BI INCL CAD: CPT | Mod: TC

## (undated) DEVICE — SOL IRRI STRL WATER 1000ML

## (undated) DEVICE — SYR 10CC LUER LOCK

## (undated) DEVICE — COUNTER SHARPS DEVON 2 MAGNET

## (undated) DEVICE — SEALER LIGASURE LAP 37CM 5MM

## (undated) DEVICE — KIT SURGICAL TURNOVER

## (undated) DEVICE — SUT VICRYL+ 1 CT1 18IN

## (undated) DEVICE — PACK GYN LAPAROSCOPY HZD

## (undated) DEVICE — SUT 0 VICRYL PLUS CT-1 27IN

## (undated) DEVICE — TRAY SKIN SCRUB WET PREMIUM

## (undated) DEVICE — NDL HYPO REG 25G X 1 1/2

## (undated) DEVICE — APPLICATOR CHLORAPREP ORN 26ML

## (undated) DEVICE — PAD PINK TRENDELENBURG POS XL

## (undated) DEVICE — SYR IRRIGATION BULB STER 60ML

## (undated) DEVICE — APPLICATOR STRL COT 2INNR 6IN

## (undated) DEVICE — SOL .9NACL PF 100 ML

## (undated) DEVICE — ADHESIVE DERMABOND ADVANCED

## (undated) DEVICE — TRAY CATH 1-LYR URIMTR 16FR

## (undated) DEVICE — TROCAR ENDOPATH XCEL 5X100MM

## (undated) DEVICE — CANNULA ENDOPATH XCEL 5X100MM

## (undated) DEVICE — SUT MCRYL PLUS 4-0 PS2 27IN

## (undated) DEVICE — BLADE SURG STAINLESS STEEL #10

## (undated) DEVICE — COVER TABLE HVY DTY 60X90IN

## (undated) DEVICE — PARTICLE ARISTA AH BIONERT 5GM

## (undated) DEVICE — APPLICATOR ARISTA FLEX XL

## (undated) DEVICE — IRRIGATOR SUCTION W/TIP

## (undated) DEVICE — DRAPE SLUSH WARMER 66X44IN

## (undated) DEVICE — CATH RED RUBBER LATEX 14F 16IN

## (undated) DEVICE — GLOVE PROTEXIS HYDROGEL SZ8

## (undated) DEVICE — DRAPE LEGGINGS CUFF 31X48IN

## (undated) DEVICE — ELECTRODE PATIENT RETURN DISP